# Patient Record
Sex: FEMALE | Race: WHITE
[De-identification: names, ages, dates, MRNs, and addresses within clinical notes are randomized per-mention and may not be internally consistent; named-entity substitution may affect disease eponyms.]

---

## 2017-03-03 ENCOUNTER — HOSPITAL ENCOUNTER (OUTPATIENT)
Dept: HOSPITAL 45 - C.LABBC | Age: 57
Discharge: HOME | End: 2017-03-03
Attending: INTERNAL MEDICINE
Payer: COMMERCIAL

## 2017-03-03 DIAGNOSIS — E55.9: Primary | ICD-10-CM

## 2017-03-03 DIAGNOSIS — I25.10: ICD-10-CM

## 2017-03-03 LAB
ALBUMIN/GLOB SERPL: 1.2 {RATIO} (ref 0.9–2)
ALP SERPL-CCNC: 86 U/L (ref 45–117)
ALT SERPL-CCNC: 37 U/L (ref 12–78)
ANION GAP SERPL CALC-SCNC: 9 MMOL/L (ref 3–11)
AST SERPL-CCNC: 19 U/L (ref 15–37)
BUN SERPL-MCNC: 9 MG/DL (ref 7–18)
BUN/CREAT SERPL: 12.2 (ref 10–20)
CALCIUM SERPL-MCNC: 9 MG/DL (ref 8.5–10.1)
CHLORIDE SERPL-SCNC: 106 MMOL/L (ref 98–107)
CHOLEST/HDLC SERPL: 4.7 {RATIO}
CO2 SERPL-SCNC: 26 MMOL/L (ref 21–32)
CREAT SERPL-MCNC: 0.71 MG/DL (ref 0.6–1.2)
GLOBULIN SER-MCNC: 3.5 GM/DL (ref 2.5–4)
GLUCOSE SERPL-MCNC: 91 MG/DL (ref 70–99)
GLUCOSE UR QL: 43 MG/DL
KETONES UR QL STRIP: 139 MG/DL
NITRITE UR QL STRIP: 90 MG/DL (ref 0–150)
PH UR: 200 MG/DL (ref 0–200)
POTASSIUM SERPL-SCNC: 4 MMOL/L (ref 3.5–5.1)
SODIUM SERPL-SCNC: 141 MMOL/L (ref 136–145)
VERY LOW DENSITY LIPOPROT CALC: 18 MG/DL

## 2017-03-04 LAB — EST. AVERAGE GLUCOSE BLD GHB EST-MCNC: 126 MG/DL

## 2018-02-28 ENCOUNTER — HOSPITAL ENCOUNTER (INPATIENT)
Dept: HOSPITAL 45 - C.EDC | Age: 58
LOS: 3 days | Discharge: HOME | DRG: 440 | End: 2018-03-03
Attending: HOSPITALIST | Admitting: INTERNAL MEDICINE
Payer: COMMERCIAL

## 2018-02-28 VITALS
HEIGHT: 62 IN | BODY MASS INDEX: 31.4 KG/M2 | BODY MASS INDEX: 31.4 KG/M2 | HEIGHT: 62 IN | WEIGHT: 170.64 LBS | WEIGHT: 170.64 LBS

## 2018-02-28 VITALS — HEART RATE: 82 BPM | DIASTOLIC BLOOD PRESSURE: 108 MMHG | TEMPERATURE: 99.14 F | SYSTOLIC BLOOD PRESSURE: 172 MMHG

## 2018-02-28 DIAGNOSIS — Z88.5: ICD-10-CM

## 2018-02-28 DIAGNOSIS — K85.90: Primary | ICD-10-CM

## 2018-02-28 DIAGNOSIS — Z82.3: ICD-10-CM

## 2018-02-28 DIAGNOSIS — Z79.82: ICD-10-CM

## 2018-02-28 DIAGNOSIS — Z88.1: ICD-10-CM

## 2018-02-28 LAB
ALBUMIN SERPL-MCNC: 3.4 GM/DL (ref 3.4–5)
ALP SERPL-CCNC: 77 U/L (ref 45–117)
ALT SERPL-CCNC: 33 U/L (ref 12–78)
AST SERPL-CCNC: 61 U/L (ref 15–37)
BASOPHILS # BLD: 0 K/UL (ref 0–0.2)
BASOPHILS NFR BLD: 0 %
BUN SERPL-MCNC: 14 MG/DL (ref 7–18)
CALCIUM SERPL-MCNC: 8.1 MG/DL (ref 8.5–10.1)
CO2 SERPL-SCNC: 27 MMOL/L (ref 21–32)
CREAT SERPL-MCNC: 0.77 MG/DL (ref 0.6–1.2)
EOS ABS #: 0 K/UL (ref 0–0.5)
EOSINOPHIL NFR BLD AUTO: 240 K/UL (ref 130–400)
GLUCOSE SERPL-MCNC: 96 MG/DL (ref 70–99)
HCT VFR BLD CALC: 41.4 % (ref 37–47)
HGB BLD-MCNC: 15.3 G/DL (ref 12–16)
IG#: 0.02 K/UL (ref 0–0.02)
IMM GRANULOCYTES NFR BLD AUTO: 5.4 %
INFLUENZA B ANTIGEN: (no result)
INR PPP: 1 (ref 0.9–1.1)
KETONES UR QL STRIP: 72 MG/DL
LIPASE: 2118 U/L (ref 73–393)
LYMPHOCYTES # BLD: 0.48 K/UL (ref 1.2–3.4)
MCH RBC QN AUTO: 30.5 PG (ref 25–34)
MCHC RBC AUTO-ENTMCNC: 37 G/DL (ref 32–36)
MCV RBC AUTO: 82.5 FL (ref 80–100)
MONO ABS #: 0.58 K/UL (ref 0.11–0.59)
MONOCYTES NFR BLD: 6.5 %
NEUT ABS #: 7.82 K/UL (ref 1.4–6.5)
NEUTROPHILS # BLD AUTO: 0 %
NEUTROPHILS NFR BLD AUTO: 87.9 %
PH UR: 123 MG/DL (ref 0–200)
PMV BLD AUTO: 9.8 FL (ref 7.4–10.4)
POTASSIUM SERPL-SCNC: 3.5 MMOL/L (ref 3.5–5.1)
PROT SERPL-MCNC: 6.8 GM/DL (ref 6.4–8.2)
RED CELL DISTRIBUTION WIDTH CV: 13 % (ref 11.5–14.5)
RED CELL DISTRIBUTION WIDTH SD: 38.6 FL (ref 36.4–46.3)
SODIUM SERPL-SCNC: 134 MMOL/L (ref 136–145)
WBC # BLD AUTO: 8.9 K/UL (ref 4.8–10.8)

## 2018-02-28 RX ADMIN — PANTOPRAZOLE SODIUM SCH MLS/MIN: 40 INJECTION, POWDER, FOR SOLUTION INTRAVENOUS at 22:16

## 2018-02-28 RX ADMIN — ENOXAPARIN SODIUM SCH MG: 40 INJECTION SUBCUTANEOUS at 22:00

## 2018-02-28 RX ADMIN — OSELTAMIVIR PHOSPHATE SCH MG: 75 CAPSULE ORAL at 21:05

## 2018-02-28 RX ADMIN — SODIUM CHLORIDE SCH MLS/HR: 900 INJECTION, SOLUTION INTRAVENOUS at 21:07

## 2018-02-28 RX ADMIN — ONDANSETRON PRN MG: 2 INJECTION INTRAMUSCULAR; INTRAVENOUS at 22:27

## 2018-02-28 NOTE — HISTORY AND PHYSICAL
History & Physical


Date & Time of Service:


Feb 28, 2018 at 18:22


Chief Complaint:


Nausea And Vomiting


Primary Care Physician:


Carlos Patino M.D.


History of Present Illness


Source:  patient


57-year-old female with history of hypertension, patient stopped taking 

medications for her blood pressure claiming that I stop taking the medicine 

because nothing is working, also patient reported high blood sugar she does not 

take any medications of that.  Patient only takes aspirin 81 mg once daily.  

Presented to the ED with 3 days history of epigastric pain nausea and vomiting.

  Denies any hematemesis denies any fever or chills.  Admits to some upper 

respiratory tract infection and shortness of breath, patient gets cough when 

she tries to take a deep breath.  In the ED patient was found to have a lipase 

of 2000, she also was found to have influenza positive, her blood pressure 

systolic was more than 200 but she was asymptomatic from that aspect.





Patient denies alcohol drinking, states she drinks socially.


I have a feeling that she is downplaying the amount she drinks because she said 

her  disagreed with her comments that she drinks only socially, she said 

sixpacks of beer last her 4 months and she only drinks hard liquor twice or 3 

times a year.





Denies any history of dyslipidemia.





Family History





FH: arthritis


FH: stroke





Social History


Smoking Status:  Never Smoker


Smokeless Tobacco Use:  No


Drug Use:  none


Marital Status:  





Allergies


Coded Allergies:  


     Hydroquinone (Verified  Allergy, Severe, BLISTER, 2/28/18)


 AFFECTS HER AT "20%"


     Hydrocortisone (Verified  Allergy, Unknown, BLISTERS, 2/28/18)


     Latex1 -Allergic Contact Dermititis (Verified  Allergy, Unknown, BLISTERS, 

2/28/18)


     Neomycin (Verified  Allergy, Unknown, BLISTERS, 2/28/18)


Uncoded Allergies:  


     AMMONIUM THIOGLYCOLATE (Allergy, Severe, BLISTERS, 8/12/16)


 COMPONENT OF LATEX





Home Medications


Scheduled


Aspirin (Aspirin 81), 81 MG PO DAILY





Review of Systems


Review of system


Constitutional: No fever / no chills / no sweats / no weakness / no fatigue


Eyes: no blurring of vision / no eye pain / no discharge / no redness 


ENT: no hearing loss / no epistaxis /no swallowing problems


Respiratory: Nonproductive cough, mild shortness of breath, upper respiratory 

tract symptoms/ no hemoptysis


Cardiovascular: no Chest pain / no lower extremity edema / no palpitation


Abdomen: As mentioned in HPI positive for epigastric tenderness, vomiting no 

diarrhea


Musculoskeletal: no joint pain / no muscle pain / no joint swelling


Genitourinary: no dysuria / no incontinence / no urinary retention


Neurologic: no focal weakness / no numbness/tingling / no ataxia


Psychiatric: no depression symptoms / no anxiety / no insomnia 


Endocrine: no excessive thirst / no excessive urination


Hematologic: no abnormal bleeding / no bruising  / no LN swelling


Skin: No rash / no pallor





Physical Exam


Vital Signs











  Date Time  Temp Pulse Resp B/P (MAP) Pulse Ox O2 Delivery O2 Flow Rate FiO2


 


2/28/18 16:30  73 18 193/84 97 Room Air  


 


2/28/18 14:25  80 18 195/111 95 Room Air  


 


2/28/18 12:45 37.1 82 18 215/97 95 Room Air  








Physical examination


General patient appears to be comfortable, not in acute distress


HEENT: Atraumatic , normocephalic /no jaundice /no pallor /anicteric /no dry 

mucous membrane /normal external ear inspection


Neck: Supple /no swelling /central trach


Heart: S1/S2 normal/regular rate and rhythm/no gallop /no rub /no murmur


Lungs: Scattered rhonchi bilaterally, decreased air entry bilaterally.


Abdomen: Abdomen is soft, no organomegaly, no pulsatile mass, does have 

epigastric tenderness and right upper quadrant tenderness no guarding or rebound


Musculoskeletal: No swelling/no edema/no tenderness/normal range of motion


Neuro exam: Awake alert oriented 3/cranial nerves II through XII appear to be 

intact/sensation intact/moves all extremities/no abnormal movements


Psychiatric evaluation: No depressed mood/normal affect


Skin: No rash on exposed skin area/no erythema


Extremity: Normal pulse/no pitting edema/no clubbing or cyanosis


Endocrine/lymphatic: No obvious lymphadenopathy /no lymphedema





Diagnostics


Laboratory Results





Results Past 24 Hours








Test


  2/28/18


12:45 2/28/18


14:21 2/28/18


14:39 2/28/18


15:54 Range/Units


 


 


White Blood Count 8.90    4.8-10.8  K/uL


 


Red Blood Count 5.02    4.2-5.4  M/uL


 


Hemoglobin 15.3    12.0-16.0  g/dL


 


Hematocrit 41.4    37-47  %


 


Mean Corpuscular Volume 82.5      fL


 


Mean Corpuscular Hemoglobin 30.5    25-34  pg


 


Mean Corpuscular Hemoglobin


Concent 37.0


  


  


  


  32-36  g/dl


 


 


Platelet Count 240    130-400  K/uL


 


Mean Platelet Volume 9.8    7.4-10.4  fL


 


Neutrophils (%) (Auto) 87.9     %


 


Lymphocytes (%) (Auto) 5.4     %


 


Monocytes (%) (Auto) 6.5     %


 


Eosinophils (%) (Auto) 0.0     %


 


Basophils (%) (Auto) 0.0     %


 


Neutrophils # (Auto) 7.82    1.4-6.5  K/uL


 


Lymphocytes # (Auto) 0.48    1.2-3.4  K/uL


 


Monocytes # (Auto) 0.58    0.11-0.59  K/uL


 


Eosinophils # (Auto) 0.00    0-0.5  K/uL


 


Basophils # (Auto) 0.00    0-0.2  K/uL


 


RDW Standard Deviation 38.6    36.4-46.3  fL


 


RDW Coefficient of Variation 13.0    11.5-14.5  %


 


Immature Granulocyte % (Auto) 0.2     %


 


Immature Granulocyte # (Auto) 0.02    0.00-0.02  K/uL


 


Bedside Troponin I  < 0.030   0-0.045  ng/ml


 


Influenza Type A Antigen   POS for Influ A  NEG  


 


Influenza Type B Antigen   Neg for Influ B  NEG  


 


Sodium Level    134 136-145  mmol/L


 


Potassium Level    3.5 3.5-5.1  mmol/L


 


Chloride Level    100   mmol/L


 


Carbon Dioxide Level    27 21-32  mmol/L


 


Anion Gap    7.0 3-11  mmol/L


 


Blood Urea Nitrogen    14 7-18  mg/dl


 


Creatinine


  


  


  


  0.77


  0.60-1.20


mg/dl


 


Est Creatinine Clear Calc


Drug Dose 


  


  


  80.0


   ml/min


 


 


Estimated GFR (


American) 


  


  


  98.0


   


 


 


Estimated GFR (Non-


American 


  


  


  84.5


   


 


 


BUN/Creatinine Ratio    18.1 10-20  


 


Random Glucose    96 70-99  mg/dl


 


Calcium Level    8.1 8.5-10.1  mg/dl


 


Total Bilirubin    0.3 0.2-1  mg/dl


 


Direct Bilirubin    0.1 0-0.2  mg/dl


 


Aspartate Amino Transf


(AST/SGOT) 


  


  


  61


  15-37  U/L


 


 


Alanine Aminotransferase


(ALT/SGPT) 


  


  


  33


  12-78  U/L


 


 


Alkaline Phosphatase    77   U/L


 


Total Protein    6.8 6.4-8.2  gm/dl


 


Albumin    3.4 3.4-5.0  gm/dl


 


Lipase    2118   U/L


 


Test


  2/28/18


18:02 


  


  


  Range/Units


 











Diagnostic Radiology


CT scan abdomen results


IMPRESSION:  


1. No evidence of bowel obstruction. No evidence of free air


2. No CT evidence of pancreatitis.


3. Mild hepatic steatosis.


4. 21 mm left adrenal gland nodule. Right adrenal gland thickening.


5. No evidence of acute diverticulitis.


6. No evidence of acute appendicitis.





Impression


Assessment and Plan


57-year-old female noncompliant with past medical history of hypertension, 

nontreated, obesity and elevated blood sugar presented to the emergency room 

with vomiting/abdominal pain/cough and shortness of breath.





Assessment


Influenza A infection


Acute pancreatitis


Hypertensive crisis


Obesity


Mildly elevated blood sugar


Incidental finding of 21 mm left adrenal gland nodule. Right adrenal gland 

thickening





Plan


Admit patient to telemetry


Patient is asymptomatic from her hypertension, reported having hypertension 

that is resistant to treatment in the past, will treat her blood pressure very 

slowly over a period of time, start today on metoprolol 25 mg p.o. twice daily, 

hydralazine only for systolic blood pressure more than 180, target pressure 

drop is less than 20% within the next 24 hours.  Avoid lisinopril as it can 

cause pancreatitis but self.


Start Tamiflu for influenza


Keep n.p.o. for bowel rest except medications and ice chips and sips of water


Introduce oral intake as early as possible


Generous IV fluid hydration


CT scan abdomen ruled out hemorrhagic pancreatitis /gallbladder stones , 

results attached


Hold off on antibiotics unless hemorrhagic pancreatitis is suspected


Lipid spaniel rule out hypertriglyceridemia


Hemoglobin A1c rule out diabetes


Pain management


Pepcid IV twice daily


Heparin for DVT prophylaxis





Resuscitation Status








VTE Prophylaxis


Will order VTE Prophylaxis:  Yes

## 2018-02-28 NOTE — DIAGNOSTIC IMAGING REPORT
CHEST ONE VIEW PORTABLE



CLINICAL HISTORY: Chest pain, nausea and vomiting.    



COMPARISON STUDY:  No previous studies for comparison.



FINDINGS: Lung volumes are normal. There is no pneumothorax or pleural effusion.

There is no consolidation to suggest pneumonia. Minimal bibasilar opacities

favor atelectasis. There is no evidence for pulmonary edema. Cardiomediastinal

silhouette is normal. 



IMPRESSION:  No acute cardiopulmonary findings. 









Electronically signed by:  Capo Palacios M.D.

2/28/2018 2:28 PM



Dictated Date/Time:  2/28/2018 2:27 PM

## 2018-02-28 NOTE — DIAGNOSTIC IMAGING REPORT
CT ABD/PELVIS IV CONTRAST ONLY



CLINICAL HISTORY: Epigastric pain. Possible pancreatitis.    



COMPARISON STUDY:  August 12, 2016



TECHNIQUE: Following the IV administration of     mL of Optiray-320, CT scan of

the abdomen and pelvis was performed from the lung bases to the proximal femurs.

Images are reviewed in the axial, sagittal, and coronal planes. IV contrast was

administered without complication.  A dose lowering technique was utilized

adhering to the principles of ALARA.





CT DOSE: 544.05 mGy.cm



FINDINGS:



Lower chest: There is respiratory motion artifact. The heart is normal in size.

There is minor basilar atelectasis.



Liver: There is mild hepatic steatosis. No focal masses are visualized. There is

no ductal dilatation.



Gallbladder: Unremarkable.



Spleen: Normal in size and attenuation.



Pancreas: Unremarkable.



Adrenal glands: There is a 21 mm left adrenal gland nodule. There is mild right

adrenal gland thickening.



Kidneys: There is symmetric renal cortical enhancement. The kidneys are normal

in size without hydronephrosis.



Bowel: There are no transition zones indicate bowel obstruction. There is no

acute diverticulitis. There is no evidence of acute appendicitis. Peritoneum:

There is no intraperitoneal free air or abdominal ascites.



Vasculature: The abdominal aorta is normal in course and caliber.



Adenopathy: None.



Pelvic viscera: The bladder, and pelvic viscera are unremarkable.



Skeletal structures: No destructive osseous lesions are seen.



IMPRESSION:  

1. No evidence of bowel obstruction. No evidence of free air

2. No CT evidence of pancreatitis.

3. Mild hepatic steatosis.

4. 21 mm left adrenal gland nodule. Right adrenal gland thickening.

5. No evidence of acute diverticulitis.

6. No evidence of acute appendicitis.







Electronically signed by:  Keny Waggoner M.D.

2/28/2018 5:59 PM



Dictated Date/Time:  2/28/2018 5:55 PM

## 2018-02-28 NOTE — EMERGENCY ROOM VISIT NOTE
History


Report prepared by Debi:  Ivan Torres


Under the Supervision of:  Dr. Harsha Rojas M.D.


First contact with patient:  14:00


Chief Complaint:  NAUSEA


Stated Complaint:  NAUSEA AND VOMITING


Nursing Triage Summary:  


Patient arrived via ALS from home. Patient c/o intermittent N/V/d x3 days and a 


burning sensation in her stomach. Patient has hx of hypertension and is 


non-compliant with medications. Patient states, "I do not take the medications 


for my blood pressure because they don't work." Patient denies CP and SOB.





History of Present Illness


The patient is a 57 year old female who presents to the Emergency Room with 

complaints of worsening nausea that began three days ago. She has a past 

medical history of a bilateral oophorectomy and hypertension, but is non-

compliant with her blood pressure medications. The patient's  notes that 

he and his coworkers at work, recently had similar symptoms and believes he may 

have gotten her sick. The patient's symptoms began with chills and generalized 

weakness. She then progressed to experiencing nausea with episodes of vomiting 

intermittently. She is unable to eat solids but has been able to keep liquids 

down. She is also experiencing a headache with some left sided abdominal pain. 

She denies any fevers, chest pain, shortness of breath, diarrhea, or numbness.





   Source of History:  patient


   Onset:  three days ago


   Position:  other (GI)


   Symptom Intensity:  moderate


   Quality:  other (Nausea)


   Timing:  constant


   Associated Symptoms:  + chills, + headache, + vomiting, + abdominal pain, + 

weakness (generalized), No fevers, No chest pain, No SOB, No diarrhea





Review of Systems


See HPI for pertinent positives & negatives. A total of 10 systems reviewed and 

were otherwise negative.





Past Medical & Surgical


Medical Problems:


(1) Acute pancreatitis


(2) Gastroenteritis


(3) Hypertension





Old medical records were reviewed. Nurse's notes were reviewed and I agree with.





Family History





FH: arthritis


FH: stroke





Social History


Smoking Status:  Never Smoker


Smokeless Tobacco Use:  No


Drug Use:  none


Marital Status:  


Housing Status:  lives with significant other





Current/Historical Medications


Scheduled


Aspirin (Aspirin 81), 81 MG PO DAILY





Allergies


Coded Allergies:  


     Hydroquinone (Verified  Allergy, Severe, BLISTER, 2/28/18)


 AFFECTS HER AT "20%"


     Hydrocortisone (Verified  Allergy, Unknown, BLISTERS, 2/28/18)


     Latex1 -Allergic Contact Dermititis (Verified  Allergy, Unknown, BLISTERS, 

2/28/18)


     Neomycin (Verified  Allergy, Unknown, BLISTERS, 2/28/18)


Uncoded Allergies:  


     AMMONIUM THIOGLYCOLATE (Allergy, Severe, BLISTERS, 8/12/16)


 COMPONENT OF LATEX





Physical Exam


Vital Signs











  Date Time  Temp Pulse Resp B/P (MAP) Pulse Ox O2 Delivery O2 Flow Rate FiO2


 


2/28/18 18:30  83 18 189/100 95 Room Air  


 


2/28/18 16:30  73 18 193/84 97 Room Air  


 


2/28/18 14:25  80 18 195/111 95 Room Air  


 


2/28/18 12:45 37.1 82 18 215/97 95 Room Air  











Physical Exam


General: Mildly-ill appearing middle-aged female in no acute distress. 


HEENT: Normal cephalic atraumatic.  Pupils are equal round and reactive to 

light.  Extraocular movements are intact.  Oropharynx is pink with moist mucous 

membranes.  No swelling of the mouth lips or tongue.


Neck: Supple with a midline trachea.  No meningeal signs or stiffness, no JVD 

or bruits. No Stridor.


Chest: Clear to auscultation bilaterally.  No wheezes or rhonchi.  No increased 

work of breathing.


Heart: regular rate and rhythm. 


Abdomen: Soft, mildly diffusely tender, nondistended without rebound guarding 

or rigidity.  


Extremities: No cyanosis clubbing or edema. No calf tenderness or assymetry


Spine/Back. Non tender to palpation. No CVA tenderness


Skin: Good turgor without rashes.


Neurologic exam: Cranial nerves two through 12 are intact.  Motor and sensation 

are intact and symmetrical throughout.





Medical Decision & Procedures


ER Provider


Diagnostic Interpretation:


Radiology results as stated below per my review and radiologist interpretation:





CHEST ONE VIEW PORTABLE





CLINICAL HISTORY: Chest pain, nausea and vomiting.    





COMPARISON STUDY:  No previous studies for comparison.





FINDINGS: Lung volumes are normal. There is no pneumothorax or pleural effusion.


There is no consolidation to suggest pneumonia. Minimal bibasilar opacities


favor atelectasis. There is no evidence for pulmonary edema. Cardiomediastinal


silhouette is normal. 





IMPRESSION:  No acute cardiopulmonary findings. 





Electronically signed by:  Capo Palacios M.D.


2/28/2018 2:28 PM





Dictated Date/Time:  2/28/2018 2:27 PM





Laboratory Results


2/28/18 12:45








Red Blood Count 5.02, Mean Corpuscular Volume 82.5, Mean Corpuscular Hemoglobin 

30.5, Mean Corpuscular Hemoglobin Concent 37.0, Mean Platelet Volume 9.8, 

Neutrophils (%) (Auto) 87.9, Lymphocytes (%) (Auto) 5.4, Monocytes (%) (Auto) 

6.5, Eosinophils (%) (Auto) 0.0, Basophils (%) (Auto) 0.0, Neutrophils # (Auto) 

7.82, Lymphocytes # (Auto) 0.48, Monocytes # (Auto) 0.58, Eosinophils # (Auto) 

0.00, Basophils # (Auto) 0.00





2/28/18 15:54

















Test


  2/28/18


12:45 2/28/18


14:21 2/28/18


14:39 2/28/18


15:54


 


White Blood Count


  8.90 K/uL


(4.8-10.8) 


  


  


 


 


Red Blood Count


  5.02 M/uL


(4.2-5.4) 


  


  


 


 


Hemoglobin


  15.3 g/dL


(12.0-16.0) 


  


  


 


 


Hematocrit 41.4 % (37-47)    


 


Mean Corpuscular Volume


  82.5 fL


() 


  


  


 


 


Mean Corpuscular Hemoglobin


  30.5 pg


(25-34) 


  


  


 


 


Mean Corpuscular Hemoglobin


Concent 37.0 g/dl


(32-36) 


  


  


 


 


Platelet Count


  240 K/uL


(130-400) 


  


  


 


 


Mean Platelet Volume


  9.8 fL


(7.4-10.4) 


  


  


 


 


Neutrophils (%) (Auto) 87.9 %    


 


Lymphocytes (%) (Auto) 5.4 %    


 


Monocytes (%) (Auto) 6.5 %    


 


Eosinophils (%) (Auto) 0.0 %    


 


Basophils (%) (Auto) 0.0 %    


 


Neutrophils # (Auto)


  7.82 K/uL


(1.4-6.5) 


  


  


 


 


Lymphocytes # (Auto)


  0.48 K/uL


(1.2-3.4) 


  


  


 


 


Monocytes # (Auto)


  0.58 K/uL


(0.11-0.59) 


  


  


 


 


Eosinophils # (Auto)


  0.00 K/uL


(0-0.5) 


  


  


 


 


Basophils # (Auto)


  0.00 K/uL


(0-0.2) 


  


  


 


 


RDW Standard Deviation


  38.6 fL


(36.4-46.3) 


  


  


 


 


RDW Coefficient of Variation


  13.0 %


(11.5-14.5) 


  


  


 


 


Immature Granulocyte % (Auto) 0.2 %    


 


Immature Granulocyte # (Auto)


  0.02 K/uL


(0.00-0.02) 


  


  


 


 


Bedside Troponin I


  


  < 0.030 ng/ml


(0-0.045) 


  


 


 


Influenza Type A Antigen


  


  


  POS for Influ


A (NEG) 


 


 


Influenza Type B Antigen


  


  


  Neg for Influ


B (NEG) 


 


 


Anion Gap


  


  


  


  7.0 mmol/L


(3-11)


 


Est Creatinine Clear Calc


Drug Dose 


  


  


  80.0 ml/min 


 


 


Estimated GFR (


American) 


  


  


  98.0 


 


 


Estimated GFR (Non-


American 


  


  


  84.5 


 


 


BUN/Creatinine Ratio    18.1 (10-20) 


 


Calcium Level


  


  


  


  8.1 mg/dl


(8.5-10.1)


 


Total Bilirubin


  


  


  


  0.3 mg/dl


(0.2-1)


 


Direct Bilirubin


  


  


  


  0.1 mg/dl


(0-0.2)


 


Aspartate Amino Transf


(AST/SGOT) 


  


  


  61 U/L (15-37) 


 


 


Alanine Aminotransferase


(ALT/SGPT) 


  


  


  33 U/L (12-78) 


 


 


Alkaline Phosphatase


  


  


  


  77 U/L


()


 


Total Protein


  


  


  


  6.8 gm/dl


(6.4-8.2)


 


Albumin


  


  


  


  3.4 gm/dl


(3.4-5.0)


 


Lipase


  


  


  


  2118 U/L


()





Laboratory studies as stated above per my review.





Medications Administered











 Medications


  (Trade)  Dose


 Ordered  Sig/Maryam


 Route  Start Time


 Stop Time Status Last Admin


Dose Admin


 


 Sodium Chloride  1,000 ml @ 


 999 mls/hr  Q1H1M STAT


 IV  2/28/18 14:10


 2/28/18 15:10 DC 2/28/18 14:27


999 MLS/HR


 


 Ondansetron HCl


  (Zofran Inj)  4 mg  NOW  STAT


 IV  2/28/18 14:10


 2/28/18 14:12 DC 2/28/18 14:27


4 MG


 


 Morphine Sulfate


  (MoRPHine


 SULFATE INJ)  2 mg  NOW  STAT


 IV  2/28/18 14:10


 2/28/18 14:12 DC 2/28/18 14:27


2 MG


 


 Oseltamivir


 Phosphate


  (Tamiflu Cap)  75 mg  NOW  STAT


 PO  2/28/18 17:01


 2/28/18 17:02 DC 2/28/18 18:34


75 MG











ECG Per My Interpretation


Indication:  nausea


Rate (beats per minute):  75


Rhythm:  normal sinus


Findings:  no acute ischemic change, other (No PVC)


Comparison ECG Date:  no prior available





ED Course


1400: Past medical records reviewed. The patient was evaluated in room C5, and 

a complete history and physical examination were performed.





1410: Ordered Morphine Sulfate 2 mg IV, Zofran Inj 4 mg IV, Sodium Chloride 

1000 ml @ 999 mls/hr IV





1548: The patient is beginning to feel better. She is positive for the flu. 





1701: Ordered Tamiflu Cap 75 mg PO





20671: Upon reevaluation, the patient is resting. I discussed the results and 

treatment plan with the patient. She verbalized agreement of the treatment 

plan. The patient will be evaluated by Dr. Whitney Cisse of [facility] for 

further management.





Medical Decision


Differentials include, but are not limited to; gastroenteritis, infection, 

cardiac disease, and electrolyte or metabolic abnormality.





This patient comes in as described above.  She has had nausea, vomiting ,

diarrhea, and flulike symptoms.  Her  is also been sick.  She has some 

mild abdominal pain.  She has no history of pancreatitis or drinking.  IV 

access established and she was hydrated with IV normal saline. she given IV 

Zofran is feeling quite a bit better.  EKG does not suggest acute coronary 

syndrome or arrhythmia influenza swab was positive.  She was placed on droplet 

isolation.  She was given Tamiflu.  Also her lipase came back elevated over 

2000.  She appears to have pancreatitis as well.  I do think she needs to be 

admitted for further treatment and evaluation of pancreatitis have ordered a 

CAT scan as well as consulted the [facility] hospitalist to 

see her in the ER.  She was happy with the plan and will be admitted





Medication Reconcilliation


Current Medication List:  was personally reviewed by me





Blood Pressure Screening


Patient's blood pressure:  Elevated blood pressure


Referred to the hospitalist





Consults


Time Called:  1709


Consulting Physician:  Dr. Whitney Cisse - [facility]


Returned Call:  [phone]


Discussed the patient's case. The patient will be evaluated for further 

management.





Impression





 Primary Impression:  


 Pancreatitis


 Additional Impression:  


 Influenza





Scribe Attestation


The scribe's documentation has been prepared under my direction and personally 

reviewed by me in its entirety. I confirm that the note above accurately 

reflects all work, treatment, procedures, and medical decision making performed 

by me.





Departure Information


Dispostion


Being Evaluated By Hospitalist





Referrals


Carlos Patino M.D. (PCP)





Patient Instructions


My [facility]





Problem Qualifiers

## 2018-03-01 VITALS
TEMPERATURE: 100.58 F | DIASTOLIC BLOOD PRESSURE: 84 MMHG | HEART RATE: 78 BPM | SYSTOLIC BLOOD PRESSURE: 127 MMHG | OXYGEN SATURATION: 92 %

## 2018-03-01 VITALS
DIASTOLIC BLOOD PRESSURE: 76 MMHG | SYSTOLIC BLOOD PRESSURE: 126 MMHG | OXYGEN SATURATION: 93 % | TEMPERATURE: 99.86 F | HEART RATE: 72 BPM

## 2018-03-01 VITALS
OXYGEN SATURATION: 93 % | HEART RATE: 72 BPM | TEMPERATURE: 98.78 F | SYSTOLIC BLOOD PRESSURE: 126 MMHG | DIASTOLIC BLOOD PRESSURE: 76 MMHG

## 2018-03-01 VITALS
DIASTOLIC BLOOD PRESSURE: 89 MMHG | SYSTOLIC BLOOD PRESSURE: 155 MMHG | OXYGEN SATURATION: 92 % | TEMPERATURE: 100.04 F | HEART RATE: 72 BPM

## 2018-03-01 VITALS
SYSTOLIC BLOOD PRESSURE: 134 MMHG | TEMPERATURE: 99.68 F | OXYGEN SATURATION: 93 % | DIASTOLIC BLOOD PRESSURE: 80 MMHG | HEART RATE: 79 BPM

## 2018-03-01 VITALS — TEMPERATURE: 98.78 F

## 2018-03-01 VITALS — OXYGEN SATURATION: 93 %

## 2018-03-01 VITALS — OXYGEN SATURATION: 92 %

## 2018-03-01 LAB
ALBUMIN SERPL-MCNC: 3.1 GM/DL (ref 3.4–5)
ALP SERPL-CCNC: 70 U/L (ref 45–117)
ALT SERPL-CCNC: 33 U/L (ref 12–78)
AST SERPL-CCNC: 57 U/L (ref 15–37)
BASOPHILS # BLD: 0.01 K/UL (ref 0–0.2)
BASOPHILS NFR BLD: 0.1 %
BUN SERPL-MCNC: 13 MG/DL (ref 7–18)
CALCIUM SERPL-MCNC: 8.1 MG/DL (ref 8.5–10.1)
CO2 SERPL-SCNC: 24 MMOL/L (ref 21–32)
CREAT SERPL-MCNC: 0.78 MG/DL (ref 0.6–1.2)
EOS ABS #: 0 K/UL (ref 0–0.5)
EOSINOPHIL NFR BLD AUTO: 220 K/UL (ref 130–400)
GLUCOSE SERPL-MCNC: 86 MG/DL (ref 70–99)
HBA1C MFR BLD: 6 % (ref 4.5–5.6)
HCT VFR BLD CALC: 37.4 % (ref 37–47)
HGB BLD-MCNC: 13.2 G/DL (ref 12–16)
IG#: 0.02 K/UL (ref 0–0.02)
IMM GRANULOCYTES NFR BLD AUTO: 11.3 %
LIPASE: 808 U/L (ref 73–393)
LYMPHOCYTES # BLD: 0.77 K/UL (ref 1.2–3.4)
MCH RBC QN AUTO: 29.7 PG (ref 25–34)
MCHC RBC AUTO-ENTMCNC: 35.3 G/DL (ref 32–36)
MCV RBC AUTO: 84 FL (ref 80–100)
MONO ABS #: 1.09 K/UL (ref 0.11–0.59)
MONOCYTES NFR BLD: 16 %
NEUT ABS #: 4.94 K/UL (ref 1.4–6.5)
NEUTROPHILS # BLD AUTO: 0 %
NEUTROPHILS NFR BLD AUTO: 72.3 %
PMV BLD AUTO: 9.8 FL (ref 7.4–10.4)
POTASSIUM SERPL-SCNC: 3 MMOL/L (ref 3.5–5.1)
PROT SERPL-MCNC: 6.4 GM/DL (ref 6.4–8.2)
RED CELL DISTRIBUTION WIDTH CV: 13.1 % (ref 11.5–14.5)
RED CELL DISTRIBUTION WIDTH SD: 39.6 FL (ref 36.4–46.3)
SODIUM SERPL-SCNC: 136 MMOL/L (ref 136–145)
WBC # BLD AUTO: 6.83 K/UL (ref 4.8–10.8)

## 2018-03-01 RX ADMIN — OSELTAMIVIR PHOSPHATE SCH MG: 75 CAPSULE ORAL at 21:21

## 2018-03-01 RX ADMIN — Medication SCH MG: at 09:15

## 2018-03-01 RX ADMIN — OSELTAMIVIR PHOSPHATE SCH MG: 75 CAPSULE ORAL at 09:15

## 2018-03-01 RX ADMIN — METOPROLOL TARTRATE SCH MG: 25 TABLET, FILM COATED ORAL at 21:21

## 2018-03-01 RX ADMIN — METOPROLOL TARTRATE SCH MG: 25 TABLET, FILM COATED ORAL at 09:15

## 2018-03-01 RX ADMIN — SODIUM CHLORIDE SCH MLS/HR: 900 INJECTION, SOLUTION INTRAVENOUS at 16:01

## 2018-03-01 RX ADMIN — ENOXAPARIN SODIUM SCH MG: 40 INJECTION SUBCUTANEOUS at 21:22

## 2018-03-01 RX ADMIN — MORPHINE SULFATE PRN MG: 2 INJECTION, SOLUTION INTRAMUSCULAR; INTRAVENOUS at 19:25

## 2018-03-01 RX ADMIN — MORPHINE SULFATE PRN MG: 2 INJECTION, SOLUTION INTRAMUSCULAR; INTRAVENOUS at 00:15

## 2018-03-01 RX ADMIN — PANTOPRAZOLE SODIUM SCH MLS/MIN: 40 INJECTION, POWDER, FOR SOLUTION INTRAVENOUS at 11:14

## 2018-03-01 RX ADMIN — SODIUM CHLORIDE SCH MLS/HR: 900 INJECTION, SOLUTION INTRAVENOUS at 06:28

## 2018-03-01 RX ADMIN — ONDANSETRON PRN MG: 2 INJECTION INTRAMUSCULAR; INTRAVENOUS at 19:25

## 2018-03-01 NOTE — DIAGNOSTIC IMAGING REPORT
ABDOMEN LIMITED (US)



CLINICAL HISTORY: 57 years-old Female presenting with pancreatitis, need GB,

liver, pancrease, the biliary system. 



TECHNIQUE: Real-time grayscale and limited color Doppler ultrasound imaging of

the abdomen limited to the right upper quadrant was performed. 



COMPARISON: CT from 2/28/2018.



FINDINGS:



Pancreas: Visualized portions of the pancreatic head and body normal. Mild

prominence of the pancreatic duct, which is within the range of normal.



Liver: Mildly hyperechogenic parenchyma, although the right hemidiaphragm

remains visible, likely indicating mild steatosis. The liver measures 14.6 cm in

maximal sagittal dimension. No sonographic evidence of hepatic mass. Main portal

vein patent with normal directional flow.



Biliary: No intrahepatic biliary ductal dilatation. Common bile duct measures up

to 4 mm in diameter.



Gallbladder: No evidence of gallstones, gallbladder wall thickening, gallbladder

distention, or pericholecystic fluid or inflammatory change.    



Right kidney: Normal in appearance. No hydronephrosis.



Ascites: None.



IMPRESSION: 

1.  Hepatic steatosis. Correlate with liver function tests to exclude

steatohepatitis as a cause for abdominal pain.



2.  No cholelithiasis or biliary ductal dilatation.







Electronically signed by:  Carlos Reid M.D.

3/1/2018 7:11 PM



Dictated Date/Time:  3/1/2018 7:09 PM

## 2018-03-01 NOTE — HOSPITALIST PROGRESS NOTE
Hospitalist Progress Note


Date of Service


Mar 1, 2018.





Subjective


Pt evaluation today including:  conversation w/ patient, physical exam, lab 

review, review of studies, review of inpatient medication list


Voiding:  no voiding problems


Patient resting in bed. Feels improved since admission. 


No appetite- will start clears and advance as tolerated slowly. 


Epigastric abdominal pain has improved. Currently mild and well controlled. 


Overall body aches and weakness- RESOLVED. 





Patient denies any fever, chills, sweats, lightheadedness, dizziness, vision 

changes, CP, palpitations, edema, SOB, wheezing, cough, nausea, vomiting, 

diarrhea, urinary symptoms, melena, numbness/tingling, weakness, muscle/joint 

pain, anxiety/depression, active bleeding, or new skin discoloration/changes.





Medications





Current Inpatient Medications








 Medications


  (Trade)  Dose


 Ordered  Sig/Maryam


 Route  Start Time


 Stop Time Status Last Admin


Dose Admin


 


 Ioversol


  (Optiray 320)  111 ml  UD  PRN


 IV  2/28/18 17:15


 3/4/18 17:14   


 


 


 Aspirin


  (Ecotrin Tab)  81 mg  DAILY


 PO  3/1/18 09:00


 3/31/18 08:59  3/1/18 09:15


81 MG


 


 Enoxaparin Sodium


  (Lovenox Inj)  40 mg  Q24H


 SC  2/28/18 22:00


 3/30/18 21:59   


 


 


 Sodium Chloride  1,000 ml @ 


 100 mls/hr  Q10H


 IV  2/28/18 20:00


 3/30/18 18:01  3/1/18 06:28


100 MLS/HR


 


 Acetaminophen


  (Tylenol Tab)  650 mg  Q4H  PRN


 PO  2/28/18 18:15


 3/30/18 18:14   


 


 


 Al Hydrox/Mg


 Hydrox/Simethicone


  (Maalox Max Susp)  15 ml  Q4H  PRN


 PO  2/28/18 18:15


 3/30/18 18:14   


 


 


 Magnesium


 Hydroxide


  (Milk Of


 Magnesia Susp)  30 ml  Q12H  PRN


 PO  2/28/18 18:15


 3/30/18 18:14   


 


 


 Zolpidem Tartrate


  (Ambien Tab)  5 mg  HSZ  PRN


 PO  2/28/18 18:15


 3/30/18 18:14   


 


 


 Ondansetron HCl


  (Zofran Inj)  4 mg  Q6H  PRN


 IV  2/28/18 18:15


 3/30/18 18:14  2/28/18 22:27


4 MG


 


 Nitroglycerin


  (Nitrostat Tab)  0.4 mg  UD  PRN


 SL  2/28/18 18:15


 3/30/18 18:14   


 


 


 Morphine Sulfate


  (MoRPHine


 SULFATE INJ)  2 mg  Q4H  PRN


 IV  2/28/18 18:15


 3/14/18 18:14  3/1/18 00:15


2 MG


 


 Polyethylene


  (Miralax Powder


 Packet)  17 gm  DAILY  PRN


 PO  2/28/18 18:15


 3/30/18 18:14   


 


 


 Metoprolol


 Tartrate


  (Lopressor Tab)  25 mg  BID


 PO  3/1/18 09:00


 3/31/18 08:59  3/1/18 09:15


25 MG


 


 Hydralazine HCl


  (HydrALAZINE INJ)  10 mg  Q6H  PRN


 IV.  2/28/18 18:30


 3/30/18 18:29   


 


 


 Oseltamivir


 Phosphate


  (Tamiflu Cap)  75 mg  BID


 PO  2/28/18 21:00


 3/5/18 20:59  3/1/18 09:15


75 MG


 


 Pantoprazole


 Sodium 40 mg/


 Syringe  10 ml @ 5


 mls/min  DAILY@11


 IV  2/28/18 22:00


 3/30/18 21:59  3/1/18 11:14


5 MLS/MIN











Objective


Vital Signs











  Date Time  Temp Pulse Resp B/P (MAP) Pulse Ox O2 Delivery O2 Flow Rate FiO2


 


3/1/18 08:57 37.1 72 16 126/76 (93) 93 Room Air  


 


3/1/18 08:00      Room Air  


 


3/1/18 08:00     92 Room Air  


 


3/1/18 07:03 37.1       


 


3/1/18 04:39 38.1 78 20 127/84 (98) 92 Room Air  


 


3/1/18 04:00      Room Air  


 


3/1/18 00:19 37.6 79 16 134/80 (98) 93 Room Air  


 


3/1/18 00:00      Room Air  


 


2/28/18 20:29 37.3 82 18 172/108  Room Air  


 


2/28/18 19:39  82 18 175/100 93   


 


2/28/18 19:35  82 18 175/100 93 Room Air  


 


2/28/18 18:30  83 18 189/100 95 Room Air  


 


2/28/18 16:30  73 18 193/84 97 Room Air  


 


2/28/18 14:25  80 18 195/111 95 Room Air  


 


2/28/18 12:45 37.1 82 18 215/97 95 Room Air  











Physical Exam


General Appearance:  no apparent distress, + obese


Eyes:  normal inspection, PERRL


ENT:  hearing grossly normal


Neck:  supple


Respiratory/Chest:  lungs clear, no respiratory distress, no accessory muscle 

use


Cardiovascular:  regular rate, rhythm


Abdomen:  normal bowel sounds, soft, + tenderness (mild ttp of epigastric 

region )


Extremities:  no pedal edema, no calf tenderness


Neurologic/Psychiatric:  alert, normal mood/affect, oriented x 3


Skin:  normal color, warm/dry, no rash





Laboratory Results





Last 24 Hours








Test


  2/28/18


12:45 2/28/18


14:21 2/28/18


14:39 2/28/18


15:54


 


White Blood Count 8.90 K/uL    


 


Red Blood Count 5.02 M/uL    


 


Hemoglobin 15.3 g/dL    


 


Hematocrit 41.4 %    


 


Mean Corpuscular Volume 82.5 fL    


 


Mean Corpuscular Hemoglobin 30.5 pg    


 


Mean Corpuscular Hemoglobin


Concent 37.0 g/dl 


  


  


  


 


 


Platelet Count 240 K/uL    


 


Mean Platelet Volume 9.8 fL    


 


Neutrophils (%) (Auto) 87.9 %    


 


Lymphocytes (%) (Auto) 5.4 %    


 


Monocytes (%) (Auto) 6.5 %    


 


Eosinophils (%) (Auto) 0.0 %    


 


Basophils (%) (Auto) 0.0 %    


 


Neutrophils # (Auto) 7.82 K/uL    


 


Lymphocytes # (Auto) 0.48 K/uL    


 


Monocytes # (Auto) 0.58 K/uL    


 


Eosinophils # (Auto) 0.00 K/uL    


 


Basophils # (Auto) 0.00 K/uL    


 


RDW Standard Deviation 38.6 fL    


 


RDW Coefficient of Variation 13.0 %    


 


Immature Granulocyte % (Auto) 0.2 %    


 


Immature Granulocyte # (Auto) 0.02 K/uL    


 


Prothrombin Time 10.4 SECONDS    


 


Prothromb Time International


Ratio 1.0 


  


  


  


 


 


Bedside Troponin I  < 0.030 ng/ml   


 


Influenza Type A Antigen


  


  


  POS for Influ


A 


 


 


Influenza Type B Antigen


  


  


  Neg for Influ


B 


 


 


Sodium Level    134 mmol/L 


 


Potassium Level    3.5 mmol/L 


 


Chloride Level    100 mmol/L 


 


Carbon Dioxide Level    27 mmol/L 


 


Anion Gap    7.0 mmol/L 


 


Blood Urea Nitrogen    14 mg/dl 


 


Creatinine    0.77 mg/dl 


 


Est Creatinine Clear Calc


Drug Dose 


  


  


  80.0 ml/min 


 


 


Estimated GFR (


American) 


  


  


  98.0 


 


 


Estimated GFR (Non-


American 


  


  


  84.5 


 


 


BUN/Creatinine Ratio    18.1 


 


Random Glucose    96 mg/dl 


 


Calcium Level    8.1 mg/dl 


 


Total Bilirubin    0.3 mg/dl 


 


Direct Bilirubin    0.1 mg/dl 


 


Aspartate Amino Transf


(AST/SGOT) 


  


  


  61 U/L 


 


 


Alanine Aminotransferase


(ALT/SGPT) 


  


  


  33 U/L 


 


 


Alkaline Phosphatase    77 U/L 


 


Total Protein    6.8 gm/dl 


 


Albumin    3.4 gm/dl 


 


Triglycerides Level    100 mg/dl 


 


Cholesterol Level    123 mg/dl 


 


HDL Cholesterol    31 mg/dl 


 


LDL Cholesterol, Calculated    72 mg/dl 


 


VLDL Cholesterol, Calculated    20 mg/dl 


 


Cholesterol/HDL Ratio    4.0 


 


Lipase    2118 U/L 


 


Test


  2/28/18


21:00 3/1/18


06:36 3/1/18


07:31 


 


 


Urine Color YELLOW    


 


Urine Appearance CLEAR    


 


Urine pH 7.0    


 


Urine Specific Gravity 1.035    


 


Urine Protein 1+    


 


Urine Glucose (UA) NEG    


 


Urine Ketones NEG    


 


Urine Occult Blood 1+    


 


Urine Nitrite NEG    


 


Urine Bilirubin NEG    


 


Urine Urobilinogen NEG    


 


Urine Leukocyte Esterase NEG    


 


Urine WBC (Auto) 1-5 /hpf    


 


Urine RBC (Auto) 0-4 /hpf    


 


Urine Hyaline Casts (Auto) 0 /lpf    


 


Urine Epithelial Cells (Auto) 10-20 /lpf    


 


Urine Bacteria (Auto) NEG    


 


White Blood Count  6.83 K/uL   


 


Red Blood Count  4.45 M/uL   


 


Hemoglobin  13.2 g/dL   


 


Hematocrit  37.4 %   


 


Mean Corpuscular Volume  84.0 fL   


 


Mean Corpuscular Hemoglobin  29.7 pg   


 


Mean Corpuscular Hemoglobin


Concent 


  35.3 g/dl 


  


  


 


 


Platelet Count  220 K/uL   


 


Mean Platelet Volume  9.8 fL   


 


Neutrophils (%) (Auto)  72.3 %   


 


Lymphocytes (%) (Auto)  11.3 %   


 


Monocytes (%) (Auto)  16.0 %   


 


Eosinophils (%) (Auto)  0.0 %   


 


Basophils (%) (Auto)  0.1 %   


 


Neutrophils # (Auto)  4.94 K/uL   


 


Lymphocytes # (Auto)  0.77 K/uL   


 


Monocytes # (Auto)  1.09 K/uL   


 


Eosinophils # (Auto)  0.00 K/uL   


 


Basophils # (Auto)  0.01 K/uL   


 


RDW Standard Deviation  39.6 fL   


 


RDW Coefficient of Variation  13.1 %   


 


Immature Granulocyte % (Auto)  0.3 %   


 


Immature Granulocyte # (Auto)  0.02 K/uL   


 


Sodium Level  136 mmol/L   


 


Potassium Level  3.0 mmol/L   


 


Chloride Level  103 mmol/L   


 


Carbon Dioxide Level  24 mmol/L   


 


Anion Gap  9.0 mmol/L   


 


Blood Urea Nitrogen  13 mg/dl   


 


Creatinine  0.78 mg/dl   


 


Est Creatinine Clear Calc


Drug Dose 


  73.6 ml/min 


  


  


 


 


Estimated GFR (


American) 


  96.4 


  


  


 


 


Estimated GFR (Non-


American 


  83.2 


  


  


 


 


BUN/Creatinine Ratio  16.6   


 


Random Glucose  86 mg/dl   


 


Estimated Average Glucose  126 mg/dl   


 


Hemoglobin A1c  6.0 %   


 


Calcium Level  8.1 mg/dl   


 


Magnesium Level  2.1 mg/dl   


 


Total Bilirubin  0.3 mg/dl   


 


Direct Bilirubin  < 0.1 mg/dl   


 


Aspartate Amino Transf


(AST/SGOT) 


  57 U/L 


  


  


 


 


Alanine Aminotransferase


(ALT/SGPT) 


  33 U/L 


  


  


 


 


Alkaline Phosphatase  70 U/L   


 


Total Protein  6.4 gm/dl   


 


Albumin  3.1 gm/dl   


 


Globulin  3.3 gm/dl   


 


Albumin/Globulin Ratio  0.9   


 


Lipase  808 U/L   


 


Bedside Glucose   99 mg/dl  











Assessment and Plan


57-year-old female noncompliant with past medical history of hypertension, 

nontreated, obesity and elevated blood sugar presented to the emergency room 

with vomiting/abdominal pain/cough and shortness of breath.





Acute pancreatitis, likely secondary to Influenza:


- Admitted to med/surg


- IV NSS @ 100 ml/hr 


- Lipase 2100 --> 800 


- No gallstones on CT; no excessive ETOH use; triglycerides WNL 


- Slowly advance diet as tolerated- starting w/ clears 


- IV Morphine PRN for pain management 


- IV Zofran PRN for nausea 


- Discussed w/ GI, Madison Powell- does not need to see patient and feels most 

likely from viral source 





Influenza A:


- Droplet precautions


- Tamiflu 75 mg BID x5 days- last day of treatment on 3/5 





Hypokalemia: Replace w/ PO KCL supplement- follow PRP and replace PRN 





HTN- CONTROLLED: 


- Started on Lopressor 25 mg BID 


- Continue ASA daily 


- IV Hydralazine PRN 





Obesity:


- Lipid panel reviewed; hgbA1c 6.0%


- Encourage healthy diet and exercise 





GI prophylaxis: Protonix IV daily 





DVT prophylaxis: Lovenox SQ daily 





Code status: LEVEL I, FULL 





Dispo: Discharge to home once medically stable- hopefully in the next 1-2 days

## 2018-03-02 VITALS
DIASTOLIC BLOOD PRESSURE: 94 MMHG | HEART RATE: 70 BPM | TEMPERATURE: 99.32 F | OXYGEN SATURATION: 93 % | SYSTOLIC BLOOD PRESSURE: 164 MMHG

## 2018-03-02 VITALS
HEART RATE: 71 BPM | TEMPERATURE: 100.58 F | DIASTOLIC BLOOD PRESSURE: 81 MMHG | SYSTOLIC BLOOD PRESSURE: 149 MMHG | OXYGEN SATURATION: 93 %

## 2018-03-02 VITALS
SYSTOLIC BLOOD PRESSURE: 157 MMHG | DIASTOLIC BLOOD PRESSURE: 87 MMHG | OXYGEN SATURATION: 90 % | TEMPERATURE: 99.68 F | HEART RATE: 83 BPM

## 2018-03-02 VITALS
HEART RATE: 72 BPM | OXYGEN SATURATION: 96 % | TEMPERATURE: 97.7 F | DIASTOLIC BLOOD PRESSURE: 86 MMHG | SYSTOLIC BLOOD PRESSURE: 149 MMHG

## 2018-03-02 VITALS — OXYGEN SATURATION: 90 %

## 2018-03-02 VITALS
TEMPERATURE: 99.86 F | HEART RATE: 67 BPM | SYSTOLIC BLOOD PRESSURE: 146 MMHG | DIASTOLIC BLOOD PRESSURE: 87 MMHG | OXYGEN SATURATION: 94 %

## 2018-03-02 VITALS
OXYGEN SATURATION: 92 % | HEART RATE: 71 BPM | DIASTOLIC BLOOD PRESSURE: 71 MMHG | SYSTOLIC BLOOD PRESSURE: 130 MMHG | TEMPERATURE: 99.68 F

## 2018-03-02 VITALS
OXYGEN SATURATION: 93 % | HEART RATE: 72 BPM | DIASTOLIC BLOOD PRESSURE: 86 MMHG | SYSTOLIC BLOOD PRESSURE: 144 MMHG | TEMPERATURE: 98.78 F

## 2018-03-02 VITALS — TEMPERATURE: 98.96 F

## 2018-03-02 LAB
ALBUMIN SERPL-MCNC: 3 GM/DL (ref 3.4–5)
ALP SERPL-CCNC: 59 U/L (ref 45–117)
ALT SERPL-CCNC: 29 U/L (ref 12–78)
AST SERPL-CCNC: 51 U/L (ref 15–37)
BUN SERPL-MCNC: 7 MG/DL (ref 7–18)
CALCIUM SERPL-MCNC: 8 MG/DL (ref 8.5–10.1)
CO2 SERPL-SCNC: 26 MMOL/L (ref 21–32)
CREAT SERPL-MCNC: 0.65 MG/DL (ref 0.6–1.2)
GLUCOSE SERPL-MCNC: 82 MG/DL (ref 70–99)
LIPASE: 355 U/L (ref 73–393)
POTASSIUM SERPL-SCNC: 3.1 MMOL/L (ref 3.5–5.1)
PROT SERPL-MCNC: 6.4 GM/DL (ref 6.4–8.2)
SODIUM SERPL-SCNC: 137 MMOL/L (ref 136–145)

## 2018-03-02 RX ADMIN — METOPROLOL TARTRATE SCH MG: 25 TABLET, FILM COATED ORAL at 20:39

## 2018-03-02 RX ADMIN — MORPHINE SULFATE PRN MG: 2 INJECTION, SOLUTION INTRAMUSCULAR; INTRAVENOUS at 07:59

## 2018-03-02 RX ADMIN — Medication SCH MG: at 08:00

## 2018-03-02 RX ADMIN — METOPROLOL TARTRATE SCH MG: 25 TABLET, FILM COATED ORAL at 08:00

## 2018-03-02 RX ADMIN — ENOXAPARIN SODIUM SCH MG: 40 INJECTION SUBCUTANEOUS at 20:40

## 2018-03-02 RX ADMIN — SODIUM CHLORIDE SCH MLS/HR: 900 INJECTION, SOLUTION INTRAVENOUS at 02:14

## 2018-03-02 RX ADMIN — OSELTAMIVIR PHOSPHATE SCH MG: 75 CAPSULE ORAL at 08:00

## 2018-03-02 RX ADMIN — OSELTAMIVIR PHOSPHATE SCH MG: 75 CAPSULE ORAL at 20:40

## 2018-03-02 NOTE — PROGRESS NOTE
Subjective


Date of Service:


Mar 2, 2018.


Subjective


Pt evaluation today including:  conversation w/ patient, conversation w/ family

, physical exam, chart review, lab review, review of studies, conversation w/ 

consultant, review of inpatient medication list


Still cough with some whitish sputum,  easier  to cough up, was having mild 

fever last night, denies chest pain


No vomiting however no appetite, has tried some clear liquid seems tolerating 

fair





Problem List


Medical Problems:


(1) Diarrhea


Status: Acute  





(2) GI bleed


Status: Acute  





(3) Influenza


Status: Acute  





(4) Nausea


Status: Acute  





(5) Pancreatitis


Status: Acute  





(6) Vomiting


Status: Acute  








Review of Systems


Constitutional:  + weakness, + fatigue, No fever, No chills, No sweats, No 

weight loss, No problem reported


Eyes:  No worsening of vision, No eye pain, No redness, No discharge, No 

diplopia


ENT:  No hearing loss, No unusual epistaxis, No nasal symptoms, No sore throat, 

No tinnitus, No dental problems, No trouble swallowing


Respiratory:  + cough, + wheezing, No sputum, No shortness of breath, No 

dyspnea on exertion, No dyspnea at rest, No hemoptysis


Cardiac:  No chest pain, No orthopnea, No PND, No edema, No claudication, No 

palpitations


Abdomen:  + nausea, No pain, No vomiting, No diarrhea, No constipation


Musculoskeletal:  No joint pain, No muscle pain, No swelling, No calf pain


Female :  No dysuria, No urinary frequency, No hematuria, No incontinence, No 

abnormal vaginal bleeding, No vaginal discharge


Neurologic:  No memory loss, No paralysis, No weakness, No numbness/tingling, 

No vertigo, No balance problems


Psychiatric:  No depression symptoms, No anhedonism, No anxiety, No insomnia, 

No substance abuse


Heme:  No abnormal bleeding/bruising, No clotting problems, No swollen lymph 

nodes, No night sweats


Endo:  No fatigue, No excessive thirst, No excessive urination


Skin:  No rash, No itch, No new/changing skin lesions, No color change, No 

bleeding





Objective


Vital Signs











  Date Time  Temp Pulse Resp B/P (MAP) Pulse Ox O2 Delivery O2 Flow Rate FiO2


 


3/2/18 12:00     90 Room Air  


 


3/2/18 11:32 37.7 67 17 146/87 (106) 94   


 


3/2/18 08:00     90 Room Air  


 


3/2/18 07:19 37.6 83 18 157/87 (110) 90 Room Air  


 


3/2/18 04:57 37.6 71 16 130/71 (90) 92 Room Air  


 


3/2/18 04:00      Room Air  


 


3/2/18 00:01 37.4 70 18 164/94 (117) 93   


 


3/2/18 00:00      Room Air  


 


3/1/18 20:00      Room Air  


 


3/1/18 18:01 37.8 72 16 155/89 (111) 92 Room Air  


 


3/1/18 16:00     93 Room Air  











Physical Exam


General Appearance:  WD/WN, no apparent distress


Eyes:  normal inspection, PERRL, EOMI, sclerae normal


ENT:  normal ENT inspection, hearing grossly normal, pharynx normal


Neck:  supple, no adenopathy, thyroid normal, no JVD, no carotid bruits, 

trachea midline


Respiratory/Chest:  chest non-tender, normal breath sounds, no respiratory 

distress, no accessory muscle use, + decreased breath sounds, + wheezing


Cardiovascular:  regular rate, rhythm, no edema, no gallop, no JVD, no murmur


Abdomen:  normal bowel sounds, non tender, soft, no organomegaly, no pulsatile 

mass


Extremities:  normal range of motion, non-tender, normal inspection, no pedal 

edema, no calf tenderness, normal capillary refill, pelvis stable


Neurologic/Psychiatric:  CNs II-XII nml as tested, no motor/sensory deficits, 

alert, normal mood/affect, oriented x 3


Skin:  normal color, warm/dry, no rash


Lymphatic:  no adenopathy





Laboratory Results





Last 24 Hours








Test


  3/2/18


06:54


 


Sodium Level 137 mmol/L 


 


Potassium Level 3.1 mmol/L 


 


Chloride Level 103 mmol/L 


 


Carbon Dioxide Level 26 mmol/L 


 


Anion Gap 8.0 mmol/L 


 


Blood Urea Nitrogen 7 mg/dl 


 


Creatinine 0.65 mg/dl 


 


Est Creatinine Clear Calc


Drug Dose 88.7 ml/min 


 


 


Estimated GFR (


American) 112.6 


 


 


Estimated GFR (Non-


American 97.2 


 


 


BUN/Creatinine Ratio 10.3 


 


Random Glucose 82 mg/dl 


 


Calcium Level 8.0 mg/dl 


 


Total Bilirubin 0.3 mg/dl 


 


Direct Bilirubin < 0.1 mg/dl 


 


Aspartate Amino Transf


(AST/SGOT) 51 U/L 


 


 


Alanine Aminotransferase


(ALT/SGPT) 29 U/L 


 


 


Alkaline Phosphatase 59 U/L 


 


Total Protein 6.4 gm/dl 


 


Albumin 3.0 gm/dl 


 


Globulin 3.4 gm/dl 


 


Albumin/Globulin Ratio 0.9 


 


Lipase 355 U/L 











Assessment and Plan


57-year-old female admitted because of acute pancreatitis and influenza: Stable 

and improving





Acute pancreatitis, likely secondary to Influenza, stable improving, abdominal 

CT unremarkable, 


I ordered a right upper quadrant ultrasound to investigate  the biliary tract 

system because of patient complaint right upper quadrant abdominal pain, 


Right upper quadrant ultrasound was unremarkable 


continue supportive care, IV fluid, rest, pain control, 


Increase activities, advance diet as tolerated 


cont Tamiflu 75 mg BID x5 days- last day of treatment on 4/5 days.


Possible discharge home tomorrow if able to tolerate diet and if continue to be 

stable


Continued [facility] stay due to:  home environment unsafe for pt


Discharge planning:  home

## 2018-03-03 VITALS
HEART RATE: 72 BPM | SYSTOLIC BLOOD PRESSURE: 155 MMHG | TEMPERATURE: 98.96 F | OXYGEN SATURATION: 90 % | DIASTOLIC BLOOD PRESSURE: 74 MMHG

## 2018-03-03 VITALS
OXYGEN SATURATION: 91 % | DIASTOLIC BLOOD PRESSURE: 89 MMHG | TEMPERATURE: 98.6 F | SYSTOLIC BLOOD PRESSURE: 160 MMHG | HEART RATE: 62 BPM

## 2018-03-03 VITALS
SYSTOLIC BLOOD PRESSURE: 160 MMHG | TEMPERATURE: 98.06 F | DIASTOLIC BLOOD PRESSURE: 89 MMHG | OXYGEN SATURATION: 90 % | HEART RATE: 67 BPM

## 2018-03-03 VITALS
TEMPERATURE: 98.6 F | DIASTOLIC BLOOD PRESSURE: 89 MMHG | SYSTOLIC BLOOD PRESSURE: 160 MMHG | OXYGEN SATURATION: 91 % | HEART RATE: 62 BPM

## 2018-03-03 LAB
ALBUMIN SERPL-MCNC: 2.8 GM/DL (ref 3.4–5)
ALP SERPL-CCNC: 50 U/L (ref 45–117)
ALT SERPL-CCNC: 36 U/L (ref 12–78)
AST SERPL-CCNC: 51 U/L (ref 15–37)
BUN SERPL-MCNC: 7 MG/DL (ref 7–18)
CALCIUM SERPL-MCNC: 8.1 MG/DL (ref 8.5–10.1)
CO2 SERPL-SCNC: 28 MMOL/L (ref 21–32)
CREAT SERPL-MCNC: 0.58 MG/DL (ref 0.6–1.2)
EOSINOPHIL NFR BLD AUTO: 203 K/UL (ref 130–400)
GLUCOSE SERPL-MCNC: 118 MG/DL (ref 70–99)
HCT VFR BLD CALC: 36.4 % (ref 37–47)
HGB BLD-MCNC: 12.5 G/DL (ref 12–16)
LIPASE: 242 U/L (ref 73–393)
MCH RBC QN AUTO: 29.2 PG (ref 25–34)
MCHC RBC AUTO-ENTMCNC: 34.3 G/DL (ref 32–36)
MCV RBC AUTO: 85 FL (ref 80–100)
PMV BLD AUTO: 9.6 FL (ref 7.4–10.4)
POTASSIUM SERPL-SCNC: 3.2 MMOL/L (ref 3.5–5.1)
PROT SERPL-MCNC: 6 GM/DL (ref 6.4–8.2)
RED CELL DISTRIBUTION WIDTH CV: 13 % (ref 11.5–14.5)
RED CELL DISTRIBUTION WIDTH SD: 40.6 FL (ref 36.4–46.3)
SODIUM SERPL-SCNC: 138 MMOL/L (ref 136–145)
WBC # BLD AUTO: 4.27 K/UL (ref 4.8–10.8)

## 2018-03-03 RX ADMIN — METOPROLOL TARTRATE SCH MG: 25 TABLET, FILM COATED ORAL at 07:54

## 2018-03-03 RX ADMIN — Medication SCH MG: at 07:54

## 2018-03-03 RX ADMIN — OSELTAMIVIR PHOSPHATE SCH MG: 75 CAPSULE ORAL at 07:55

## 2018-03-03 NOTE — DISCHARGE SUMMARY
Discharge Summary


Date of Service


Mar 3, 2018.





Discharge Summary


Admission Date:


Feb 28, 2018 at 18:15


Discharge Date:  Mar 3, 2018


Discharge Disposition:  Home


Principal Diagnosis:  Acute Pancreatitis





Medication Reconciliation


New Medications:  


Oseltamivir Phosphate (Tamiflu) 75 Mg Cap


75 MG PO BID for 2 Days, #4 CAP





 


Continued Medications:  


Aspirin (Aspirin 81) 81 Mg Tab


81 MG PO DAILY











Discharge Exam


Review of Systems:  


   Constitutional:  No fever, No chills, No weakness, No fatigue


   Cardiovascular:  No chest pain, No edema


   Abdomen:  No pain, No nausea, No vomiting, No diarrhea, No constipation


   Musculoskeletal:  No muscle pain, No swelling


   Neurologic:  No memory loss, No paralysis, No weakness


   Endocrine:  No fatigue


   Hematologic / Lymphatic:  No abnormal bleeding/bruising


   Integumentary:  No rash


Physical Exam:  


   General Appearance:  WD/WN, no apparent distress, + obese


   Eyes:  EOMI


   Neck:  supple


   Respiratory/Chest:  chest non-tender, lungs clear, normal breath sounds


   Cardiovascular:  regular rate, rhythm, no edema, no gallop


   Abdomen / GI:  normal bowel sounds, non tender, soft, no organomegaly, no 

pulsatile mass, normal rectal exam


   Neurologic/Psychiatric:  CNs II-XII nml as tested, alert, normal mood/affect

, normal reflexes, oriented x 3


   Skin:  normal color, warm/dry, no rash


   Lymphatic:  no adenopathy





Hospital Course


57-year-old female admitted because of acute pancreatitis and influenza: Stable 

and improving





Acute pancreatitis, likely secondary to Influenza, stable improving, abdominal 

CT unremarkable, 


I ordered a right upper quadrant ultrasound to investigate  the biliary tract 

system because of patient complaint right upper quadrant abdominal pain, 


Right upper quadrant ultrasound was unremarkable. pt tolerated the diet very 

well.Pt denies any symptoms of abdominal pain, nausea, vominting and diarrhea. 

Pt lipase trended down ,


cont Tamiflu 75 mg BID x 2days- 


We will discharge patient home and advise to stay away from alcohol.


Total Time Spent:  Greater than 30 minutes


This includes examination of the patient, discharge planning, medication 

reconciliation, and communication with other providers.





Discharge Instructions


Please refer to the electronic Patient Visit Report (Discharge Instructions) 

for additional information.





Additional Copies To


Carlos Patino M.D.

## 2018-03-03 NOTE — DISCHARGE INSTRUCTIONS
Discharge Instructions


Date of Service


Mar 3, 2018.





Admission


Reason for Admission:  Acute Pancreatitis





Discharge


Discharge Diagnosis / Problem:  ACUTE PANCREATITIS, INFLUENZA A





Discharge Goals


Goal(s):  Decrease discomfort, Improve function, Improve disease control, 

Improve nutritional status, Learn about illness, Diagnostic testing, Prevent 

Disease Progression





Activity Recommendations


Activity Limitations:  as noted below


Lifting Limitations:  gradually increase as tolerated


Exercise/Sports Limitations:  rest today


May Resume Sexual Activity:  when tolerated


Shower/Bathe:  no limitations


Driving or Machine Use:  no limitations





.





Current Hospital Diet


Patient's current hospital diet: Low Fat Diet





Discharge Diet


Recommended Diet:  AHA Diet (Heart Healthy)





Pending Studies


Studies pending at discharge:  no





Laboratory Results





Hemoglobin A1c








Test


  3/1/18


06:36 Range/Units


 


 


Estimated Average Glucose 126   mg/dl


 


Hemoglobin A1c 6.0 H 4.5-5.6  %








Lipid Panel








Test


  2/28/18


15:54 Range/Units


 


 


Triglycerides Level 100  0-150  mg/dl


 


Cholesterol Level 123  0-200  mg/dl


 


HDL Cholesterol 31   mg/dl


 


Cholesterol/HDL Ratio 4.0   


 


LDL Cholesterol, Calculated 72   mg/dl











Medical Emergencies








.


Who to Call and When:





Medical Emergencies:  If at any time you feel your situation is an emergency, 

please call 911 immediately.





.





Non-Emergent Contact


Non-Emergency issues call your:  Primary Care Provider


Call Non-Emergent contact if:  temperature is above 101, your pain is not 

controlled





.


.








"Provider Documentation" section prepared by Sam Leung








.

## 2018-04-19 ENCOUNTER — HOSPITAL ENCOUNTER (OUTPATIENT)
Dept: HOSPITAL 45 - C.LAB1850 | Age: 58
Discharge: HOME | End: 2018-04-19
Attending: INTERNAL MEDICINE
Payer: COMMERCIAL

## 2018-04-19 DIAGNOSIS — E27.9: ICD-10-CM

## 2018-04-19 DIAGNOSIS — E55.9: Primary | ICD-10-CM

## 2018-04-19 DIAGNOSIS — I10: ICD-10-CM

## 2018-04-26 ENCOUNTER — HOSPITAL ENCOUNTER (OUTPATIENT)
Dept: HOSPITAL 45 - C.LAB1850 | Age: 58
Discharge: HOME | End: 2018-04-26
Attending: INTERNAL MEDICINE
Payer: COMMERCIAL

## 2018-04-26 DIAGNOSIS — E27.9: Primary | ICD-10-CM

## 2018-05-07 ENCOUNTER — HOSPITAL ENCOUNTER (OUTPATIENT)
Dept: HOSPITAL 45 - C.LABSPEC | Age: 58
Discharge: HOME | End: 2018-05-07
Attending: INTERNAL MEDICINE
Payer: COMMERCIAL

## 2018-05-07 DIAGNOSIS — E27.8: Primary | ICD-10-CM

## 2018-08-19 ENCOUNTER — HOSPITAL ENCOUNTER (EMERGENCY)
Dept: HOSPITAL 45 - C.EDB | Age: 58
Discharge: HOME | End: 2018-08-19
Payer: COMMERCIAL

## 2018-08-19 VITALS — SYSTOLIC BLOOD PRESSURE: 164 MMHG | HEART RATE: 76 BPM | OXYGEN SATURATION: 97 % | DIASTOLIC BLOOD PRESSURE: 116 MMHG

## 2018-08-19 VITALS — TEMPERATURE: 97.7 F

## 2018-08-19 DIAGNOSIS — I10: ICD-10-CM

## 2018-08-19 DIAGNOSIS — R10.9: Primary | ICD-10-CM

## 2018-08-19 DIAGNOSIS — R11.2: ICD-10-CM

## 2018-08-19 DIAGNOSIS — Z79.82: ICD-10-CM

## 2018-08-19 LAB
ALBUMIN SERPL-MCNC: 4.1 GM/DL (ref 3.4–5)
ALP SERPL-CCNC: 87 U/L (ref 45–117)
ALT SERPL-CCNC: 21 U/L (ref 12–78)
AST SERPL-CCNC: 18 U/L (ref 15–37)
BASOPHILS # BLD: 0.03 K/UL (ref 0–0.2)
BASOPHILS NFR BLD: 0.2 %
BUN SERPL-MCNC: 14 MG/DL (ref 7–18)
CA-I BLD-SCNC: 1.09 MMOL/L (ref 1.12–1.32)
CALCIUM SERPL-MCNC: 9.2 MG/DL (ref 8.5–10.1)
CO2 SERPL-SCNC: 21 MMOL/L (ref 21–32)
CREAT BLD-MCNC: 0.7 MG/DL (ref 0.6–1.3)
CREAT SERPL-MCNC: 0.92 MG/DL (ref 0.6–1.2)
EOS ABS #: 0.05 K/UL (ref 0–0.5)
EOSINOPHIL NFR BLD AUTO: 344 K/UL (ref 130–400)
GLUCOSE SERPL-MCNC: 180 MG/DL (ref 70–99)
HCT VFR BLD CALC: 42.3 % (ref 37–47)
HGB BLD-MCNC: 14.4 G/DL (ref 12–16)
IG#: 0.05 K/UL (ref 0–0.02)
IMM GRANULOCYTES NFR BLD AUTO: 13.1 %
ISTAT POTASSIUM: 3.5 MEQ/L (ref 3.3–5)
LIPASE: 165 U/L (ref 73–393)
LYMPHOCYTES # BLD: 2.1 K/UL (ref 1.2–3.4)
MCH RBC QN AUTO: 29.1 PG (ref 25–34)
MCHC RBC AUTO-ENTMCNC: 34 G/DL (ref 32–36)
MCV RBC AUTO: 85.6 FL (ref 80–100)
MONO ABS #: 0.51 K/UL (ref 0.11–0.59)
MONOCYTES NFR BLD: 3.2 %
NEUT ABS #: 13.25 K/UL (ref 1.4–6.5)
NEUTROPHILS # BLD AUTO: 0.3 %
NEUTROPHILS NFR BLD AUTO: 82.9 %
PMV BLD AUTO: 10.1 FL (ref 7.4–10.4)
POTASSIUM SERPL-SCNC: 3.5 MMOL/L (ref 3.5–5.1)
PROT SERPL-MCNC: 7.8 GM/DL (ref 6.4–8.2)
RED CELL DISTRIBUTION WIDTH CV: 13.3 % (ref 11.5–14.5)
RED CELL DISTRIBUTION WIDTH SD: 41.8 FL (ref 36.4–46.3)
SODIUM BLD-SCNC: 142 MEQ/L (ref 135–144)
SODIUM SERPL-SCNC: 139 MMOL/L (ref 136–145)
WBC # BLD AUTO: 15.99 K/UL (ref 4.8–10.8)

## 2018-08-19 RX ADMIN — HYDROMORPHONE HYDROCHLORIDE PRN MG: 1 INJECTION, SOLUTION INTRAMUSCULAR; INTRAVENOUS; SUBCUTANEOUS at 10:32

## 2018-08-19 RX ADMIN — HYDROMORPHONE HYDROCHLORIDE PRN MG: 1 INJECTION, SOLUTION INTRAMUSCULAR; INTRAVENOUS; SUBCUTANEOUS at 08:25

## 2018-08-19 NOTE — DIAGNOSTIC IMAGING REPORT
CT OF THE ABDOMEN AND PELVIS WITH CONTRAST



CLINICAL HISTORY: Mid abdominal pain.    



COMPARISON STUDY:  CT of the abdomen and pelvis February 28, 2018 and right

upper quadrant ultrasound March 1, 2018.



TECHNIQUE: Following IV administration of 93 mL of Optiray-320, axial images of

the abdomen and pelvis were obtained from the lung bases to the proximal femurs.

Images were reviewed in the axial, sagittal, and coronal planes. IV contrast was

administered without complication.  A dose lowering technique was utilized

adhering to the principles of ALARA.





CT DOSE: 891.13 mGy.cm



FINDINGS: A small hiatal hernia is noted. A 2 cm left adrenal nodule is

unchanged and CT of August 12, 2016. This is likely benign given stability. Exam

is mildly compromised by motion artifact. There is no peripancreatic or

pericholecystic infiltration. No biliary or pancreatic ductal dilatation is

present. The spleen is normal. There is mild bilateral collecting system

dilatation, greater on the right. Bladder is mildly distended. There is mild

bladder wall thickening. No pneumatosis, free air or portal venous gas is

present. There is mild wall thickening of the a sending colon and proximal

transverse colon. There is no evidence for a bowel obstruction. The appendix is

not well visualized on this exam. There is no right lower quadrant inflammation.







IMPRESSION:  



1. Appendix not well visualized on this exam but no right lower quadrant

inflammation. 



2. Mild wall thickening of the ascending colon and transverse colon which is

likely due to underdistention although a nonspecific mild colitis could appear

similar.



3. Mild bladder wall thickening which could be correlated with urinalysis to

exclude cystitis.



4. Mild bilateral collecting system dilatation, greater on the right. This may

be related to mild bladder distention.







Electronically signed by:  Capo Palacios M.D.

8/19/2018 9:43 AM



Dictated Date/Time:  8/19/2018 9:28 AM

## 2018-08-19 NOTE — EMERGENCY ROOM VISIT NOTE
History


Report prepared by Debi:  Polo Keller


Under the Supervision of:  Dr. Adalberto Jean M.D.


First contact with patient:  07:58


Chief Complaint:  ABDOMINAL PAIN


Stated Complaint:  ABD PAIN





History of Present Illness


The patient is a 57 year old female who presents to the Emergency Room with 

abdominal pain. The patient is complaining of severe and constant abdominal 

pain since this morning. The patient notes that she does have a history of 

abdominal surgeries. She states that she has received Morphine in the past for 

pain when she has had similar pain. She denies any alcohol abuse, history of 

smoking, or drug abuse.  Pain is generalized.  No melena hematochezia dysuria 

hematuria.  Reports nausea and vomiting.  No hematemesis bilious vomiting or 

coffee-ground emesis.





   Source of History:  patient


   Onset:  This morning


   Position:  abdomen


   Symptom Intensity:  severe


   Timing:  constant





Review of Systems


See HPI for pertinent positives and negatives.  A total of ten systems were 

reviewed and were otherwise negative.





Past Medical & Surgical


Medical Problems:


(1) Acute pancreatitis


(2) Gastroenteritis


(3) Hypertension








Family History





FH: arthritis


FH: stroke





Social History


Smoking Status:  Unknown if Ever Smoked


Drug Use:  none


Marital Status:  


Housing Status:  lives with significant other





Current/Historical Medications


Scheduled


Aspirin (Aspirin 81), 81 MG PO DAILY


Cholecalciferol (Vitamin D3), 1 TAB PO DAILY





Allergies


Coded Allergies:  


     Hydroquinone (Verified  Allergy, Severe, BLISTER, 8/19/18)


 AFFECTS HER AT "20%"


     Hydrocortisone (Verified  Allergy, Unknown, BLISTERS, 8/19/18)


     Latex1 -Allergic Contact Dermititis (Verified  Allergy, Unknown, BLISTERS, 

8/19/18)


     Neomycin (Verified  Allergy, Unknown, BLISTERS, 8/19/18)


Uncoded Allergies:  


     AMMONIUM THIOGLYCOLATE (Allergy, Severe, BLISTERS, 5/14/18)


 COMPONENT OF LATEX





Physical Exam


Vital Signs











  Date Time  Temp Pulse Resp B/P (MAP) Pulse Ox O2 Delivery O2 Flow Rate FiO2


 


8/19/18 11:42  76 18 164/116 97   


 


8/19/18 11:25    157/134    


 


8/19/18 11:16  87 22 246/125 98 Room Air  


 


8/19/18 09:18  76 16 219/110 96 Room Air  


 


8/19/18 08:44  74 20  100 Room Air  


 


8/19/18 08:16  66      


 


8/19/18 07:55 36.5 71 24 229/112 100 Room Air  











Physical Exam


Physical Exam 


GENERAL:  She is oriented to person, place, and time. Patient is distressed. 


HENT:  Exam performed. 


   Head:  Normocephalic and atraumatic. 


   Right Ear:  External ear normal. No mastoid tenderness. 


   Left Ear: External ear normal. No mastoid tenderness. 


   Mouth/Throat:  The oropharynx is clear and moist. No trismus in the jaw. No 

dental abscesses or uvula swelling. No oropharyngeal exudate or tonsillar 

abscesses.


EYES: Conjunctivae and EOM are normal. Pupils are equal, round, and reactive to 

light. Right eye exhibits no discharge. Left eye exhibits no discharge. No 

scleral icterus.


NECK: Normal range of motion. Neck supple. No JVD present. No spinous process 

tenderness present. No carotid bruit present. No rigidity. No tracheal 

deviation and normal range of motion present. No Brudzinski's sign and no Kernig

's sign noted.


CV: Normal rate, regular rhythm, normal heart sounds and intact distal pulses. 

There is no peripheral edema. Palpable radial pulses bue.


PULM/CHEST:  Effort normal and breath sounds normal. No respiratory distress. 

No stridor. She has no wheezes. She has no rales. 


   Chest Wall:  She exhibits no tenderness.


ABD: The abdomen is soft. Bowel sounds are normal. She has no distension. No 

mass is present. There diffuse abdominal pain on palpation. There is no rebound

, no guarding, no Bryan's sign and no tenderness at McBurney's point. Rovsig 

negative


MUSC/SKEL: Normal range of motion. There is no peripheral edema, tenderness or 

deformity.


LYMPH: No cervical adenopathy.


NEURO: She is alert and oriented to person, place, and time. She has normal 

strength. No cranial nerve deficit or sensory deficit. Coordination and gait 

normal. GCS eye subscore is 4. GCS verbal subscore is 5. GCS motor subscore is 

6. Cerebellar tests wnl.


SKIN: Skin is warm and dry. She is not diaphoretic.


PSYCH: She has a normal mood and affect. Behavior is normal. Judgment and 

thought content normal.





Medical Decision & Procedures


ER Provider


Diagnostic Interpretation:


Radiology results as stated below per my review and radiologist interpretation: 





CT OF THE ABDOMEN AND PELVIS WITH CONTRAST





CLINICAL HISTORY: Mid abdominal pain.    





COMPARISON STUDY:  CT of the abdomen and pelvis February 28, 2018 and right


upper quadrant ultrasound March 1, 2018.





TECHNIQUE: Following IV administration of 93 mL of Optiray-320, axial images of


the abdomen and pelvis were obtained from the lung bases to the proximal femurs.


Images were reviewed in the axial, sagittal, and coronal planes. IV contrast was


administered without complication.  A dose lowering technique was utilized


adhering to the principles of ALARA.








CT DOSE: 891.13 mGy.cm





FINDINGS: A small hiatal hernia is noted. A 2 cm left adrenal nodule is


unchanged and CT of August 12, 2016. This is likely benign given stability. Exam


is mildly compromised by motion artifact. There is no peripancreatic or


pericholecystic infiltration. No biliary or pancreatic ductal dilatation is


present. The spleen is normal. There is mild bilateral collecting system


dilatation, greater on the right. Bladder is mildly distended. There is mild


bladder wall thickening. No pneumatosis, free air or portal venous gas is


present. There is mild wall thickening of the a sending colon and proximal


transverse colon. There is no evidence for a bowel obstruction. The appendix is


not well visualized on this exam. There is no right lower quadrant inflammation.











IMPRESSION:  





1. Appendix not well visualized on this exam but no right lower quadrant


inflammation. 





2. Mild wall thickening of the ascending colon and transverse colon which is


likely due to underdistention although a nonspecific mild colitis could appear


similar.





3. Mild bladder wall thickening which could be correlated with urinalysis to


exclude cystitis.





4. Mild bilateral collecting system dilatation, greater on the right. This may


be related to mild bladder distention.











Electronically signed by:  Capo Palacios M.D.


8/19/2018 9:43 AM





Dictated Date/Time:  8/19/2018 9:28 AM








CHEST ONE VIEW PORTABLE





CLINICAL HISTORY: Epigastric pain.    





COMPARISON STUDY:  Chest radiograph February 28, 2018.





FINDINGS: Lung volumes are normal. No pneumothorax or pleural effusion is noted.


There is no consolidation. Cardiac size is normal. Mediastinal contours are


normal. There is no evidence for pulmonary edema.





IMPRESSION:  No acute cardiopulmonary findings. 














Electronically signed by:  Capo Palacios M.D.


8/19/2018 8:27 AM





Dictated Date/Time:  8/19/2018 8:26 AM





Laboratory Results


8/19/18 08:05








Red Blood Count 4.94, Mean Corpuscular Volume 85.6, Mean Corpuscular Hemoglobin 

29.1, Mean Corpuscular Hemoglobin Concent 34.0, Mean Platelet Volume 10.1, 

Neutrophils (%) (Auto) 82.9, Lymphocytes (%) (Auto) 13.1, Monocytes (%) (Auto) 

3.2, Eosinophils (%) (Auto) 0.3, Basophils (%) (Auto) 0.2, Neutrophils # (Auto) 

13.25, Lymphocytes # (Auto) 2.10, Monocytes # (Auto) 0.51, Eosinophils # (Auto) 

0.05, Basophils # (Auto) 0.03





8/19/18 08:05

















Test


  8/19/18


08:05 8/19/18


08:15 8/19/18


08:30 8/19/18


10:00


 


White Blood Count


  15.99 K/uL


(4.8-10.8) 


  


  


 


 


Red Blood Count


  4.94 M/uL


(4.2-5.4) 


  


  


 


 


Hemoglobin


  14.4 g/dL


(12.0-16.0) 


  


  


 


 


Hematocrit 42.3 % (37-47)    


 


Mean Corpuscular Volume


  85.6 fL


() 


  


  


 


 


Mean Corpuscular Hemoglobin


  29.1 pg


(25-34) 


  


  


 


 


Mean Corpuscular Hemoglobin


Concent 34.0 g/dl


(32-36) 


  


  


 


 


Platelet Count


  344 K/uL


(130-400) 


  


  


 


 


Mean Platelet Volume


  10.1 fL


(7.4-10.4) 


  


  


 


 


Neutrophils (%) (Auto) 82.9 %    


 


Lymphocytes (%) (Auto) 13.1 %    


 


Monocytes (%) (Auto) 3.2 %    


 


Eosinophils (%) (Auto) 0.3 %    


 


Basophils (%) (Auto) 0.2 %    


 


Neutrophils # (Auto)


  13.25 K/uL


(1.4-6.5) 


  


  


 


 


Lymphocytes # (Auto)


  2.10 K/uL


(1.2-3.4) 


  


  


 


 


Monocytes # (Auto)


  0.51 K/uL


(0.11-0.59) 


  


  


 


 


Eosinophils # (Auto)


  0.05 K/uL


(0-0.5) 


  


  


 


 


Basophils # (Auto)


  0.03 K/uL


(0-0.2) 


  


  


 


 


RDW Standard Deviation


  41.8 fL


(36.4-46.3) 


  


  


 


 


RDW Coefficient of Variation


  13.3 %


(11.5-14.5) 


  


  


 


 


Immature Granulocyte % (Auto) 0.3 %    


 


Immature Granulocyte # (Auto)


  0.05 K/uL


(0.00-0.02) 


  


  


 


 


Estimated GFR (


American) 79.0 


  


  


  


 


 


Estimated GFR (Non-


American 68.2 


  


  


  


 


 


BUN/Creatinine Ratio 15.1 (10-20)    


 


Calcium Level


  9.2 mg/dl


(8.5-10.1) 


  


  


 


 


Total Bilirubin


  0.4 mg/dl


(0.2-1) 


  


  


 


 


Direct Bilirubin


  0.1 mg/dl


(0-0.2) 


  


  


 


 


Aspartate Amino Transf


(AST/SGOT) 18 U/L (15-37) 


  


  


  


 


 


Alanine Aminotransferase


(ALT/SGPT) 21 U/L (12-78) 


  


  


  


 


 


Alkaline Phosphatase


  87 U/L


() 


  


  


 


 


Total Protein


  7.8 gm/dl


(6.4-8.2) 


  


  


 


 


Albumin


  4.1 gm/dl


(3.4-5.0) 


  


  


 


 


Lipase


  165 U/L


() 


  


  


 


 


Bedside Hemoglobin


  


  15.3 g/dl


(12.0-16.0) 


  


 


 


Bedside Hematocrit  45 % (37-47)   


 


Bedside Sodium


  


  142 mEq/L


(135-144) 


  


 


 


Bedside Potassium


  


  3.5 mEq/L


(3.3-5.0) 


  


 


 


Bedside Chloride


  


  105 mEq/L


(101-112) 


  


 


 


Bedside Total CO2


  


  20 mEq/l


(24-31) 


  


 


 


Anion Gap


  


  21.0 mmol/L


(16-25) 


  


 


 


Bedside Blood Urea Nitrogen


  


  14 mg/dl


(7-18) 


  


 


 


Bedside Creatinine


  


  0.7 mg/dl


(0.6-1.3) 


  


 


 


Bedside Glucose (other)


  


  184 mg/dl


(70-99) 


  


 


 


Bedside Ionized Calcium (Tamera)


  


  1.09 mmol/l


(1.12-1.32) 


  


 


 


Lactic Acid Level


  


  


  3.8 mmol/L


(0.4-2.0) 


 


 


Urine Color    YELLOW 


 


Urine Appearance    CLEAR (CLEAR) 


 


Urine pH    8.0 (4.5-7.5) 


 


Urine Specific Gravity


  


  


  


  1.023


(1.000-1.030)


 


Urine Protein    NEG (NEG) 


 


Urine Glucose (UA)    NEG (NEG) 


 


Urine Ketones    NEG (NEG) 


 


Urine Occult Blood    NEG (NEG) 


 


Urine Nitrite    NEG (NEG) 


 


Urine Bilirubin    NEG (NEG) 


 


Urine Urobilinogen    NEG (NEG) 


 


Urine Leukocyte Esterase    NEG (NEG) 


 


Test


  8/19/18


11:25 


  


  


 


 


Bedside Lactic Acid Venous


  1.24 mmol/L


(0.90-1.70) 


  


  


 





Laboratory results reviewed by me





Medications Administered











 Medications


  (Trade)  Dose


 Ordered  Sig/Maryam


 Route  Start Time


 Stop Time Status Last Admin


Dose Admin


 


 Sodium Chloride  1,000 ml @ 


 999 mls/hr  Q1H1M STAT


 IV  8/19/18 08:02


 8/19/18 09:02 DC 8/19/18 08:25


999 MLS/HR


 


 Hydromorphone HCl


  (Dilaudid Inj)  1 mg  NOW  PRN


 IV  8/19/18 08:15


 8/19/18 12:52 DC 8/19/18 10:32


1 MG


 


 Ondansetron HCl


  (Zofran Inj)  4 mg  NOW  STAT


 IV  8/19/18 08:02


 8/19/18 08:05 DC 8/19/18 08:25


4 MG


 


 Lorazepam


  (Ativan Inj)  1 mg  NOW  STAT


 IV  8/19/18 08:56


 8/19/18 08:58 DC 8/19/18 09:03


1 MG


 


 Sodium Chloride  1,000 ml @ 


 999 mls/hr  Q1H1M STAT


 IV  8/19/18 09:25


 8/19/18 10:25 DC 8/19/18 09:25


999 MLS/HR











ED Course


0759: The patient was evaluated in room B10. A complete history and physical 

exam was performed. 





0802: Ordered Zofran 4 mg IV, Sodium Chloride 1000 mL @ 999 mL/hr IV





0806: The patient's EMR was reviewed and shows a history of pancreatitis.





0815: Ordered Dilaudid 1 mg IV. 





0856: Ordered Lorazepam 1 mg IV. 





0925: Ordered Sodium Chloride 1000 mL @ 999 mL IV.  





1143: I checked on the patient at this time. Her vitals are stable at this time

, labs showed a leukocytosis of 16 and a Lactic acid of 3.8. CT scan was 

negative for appendicitis. Her gallbladder is WNL. Urine did not show any signs 

of infection. After fluid bolus and pain medications the patient states that 

she feels better.  Repeat abdominal exam showed no pain on palpation of the 

abdomen.  Lactic acid was normal on repeat and was thought to be due to 

dehydration. Will follow-up with PCP. The  states that he will drive her 

home. DISCHARGE - Plan of care discussed with patient and questions answered. 

The patient was given both verbal and printed discharge instructions. The 

patient verbalized understanding and ability to comply. The patient is to seek 

outpatient follow up as noted in the discharge instructions. The patient 

verbalized understanding and ability to comply. The patient is discharged in 

stable condition. The patient was instructed to return for worsening symptoms.





Medical Decision


vitals are stable at this time, labs showed a leukocytosis of 16 and a Lactic 

acid of 3.8. CT scan was negative for appendicitis. Her gallbladder is WNL. 

Urine did not show any signs of infection. After fluid bolus and pain 

medications the patient states that she feels better.  Repeat abdominal exam 

showed no pain on palpation of the abdomen.  Lactic acid was normal on repeat 

and was thought to be due to dehydration. Will follow-up with PCP. The  

states that he will drive her home. DISCHARGE - Plan of care discussed with 

patient and questions answered. The patient was given both verbal and printed 

discharge instructions. The patient verbalized understanding and ability to 

comply. The patient is to seek outpatient follow up as noted in the discharge 

instructions. The patient verbalized understanding and ability to comply. The 

patient is discharged in stable condition. The patient was instructed to return 

for worsening symptoms.





Medication Reconcilliation


Current Medication List:  was personally reviewed by me





Blood Pressure Screening


Patient's blood pressure:  Elevated blood pressure


Blood pressure disposition:  Referred to PCP





Impression





 Primary Impression:  


 Abdominal pain





Scribe Attestation


The scribe's documentation has been prepared under my direction and personally 

reviewed by me in its entirety. I confirm that the note above accurately 

reflects all work, treatment, procedures, and medical decision making performed 

by me.





The chart was completed utilizing Dragon Speech voice recognition software. 

Grammatical errors, random word insertions, pronoun errors, and incomplete 

sentences are an occasional consequence of this system due to software 

limitations, ambient noise, and hardware issues. Any formal questions or 

concerns about the content, text, or information contained within the body of 

this dictation should be directly addressed to the physician for clarification.





Departure Information


Dispostion


Home / Self-Care





Referrals


Carlos Patino M.D. (PCP)





Patient Instructions


My [facility]





Problem Qualifiers








 Primary Impression:  


 Abdominal pain


 Abdominal location:  unspecified location  Qualified Codes:  R10.9 - 

Unspecified abdominal pain

## 2018-08-19 NOTE — DIAGNOSTIC IMAGING REPORT
CHEST ONE VIEW PORTABLE



CLINICAL HISTORY: Epigastric pain.    



COMPARISON STUDY:  Chest radiograph February 28, 2018.



FINDINGS: Lung volumes are normal. No pneumothorax or pleural effusion is noted.

There is no consolidation. Cardiac size is normal. Mediastinal contours are

normal. There is no evidence for pulmonary edema.



IMPRESSION:  No acute cardiopulmonary findings. 









Electronically signed by:  Capo Palacios M.D.

8/19/2018 8:27 AM



Dictated Date/Time:  8/19/2018 8:26 AM

## 2018-08-20 ENCOUNTER — HOSPITAL ENCOUNTER (INPATIENT)
Dept: HOSPITAL 45 - C.EDB | Age: 58
LOS: 2 days | Discharge: HOME | DRG: 439 | End: 2018-08-22
Attending: HOSPITALIST | Admitting: HOSPITALIST
Payer: COMMERCIAL

## 2018-08-20 VITALS
TEMPERATURE: 98.42 F | HEART RATE: 65 BPM | SYSTOLIC BLOOD PRESSURE: 121 MMHG | OXYGEN SATURATION: 98 % | DIASTOLIC BLOOD PRESSURE: 67 MMHG

## 2018-08-20 VITALS
HEIGHT: 62 IN | WEIGHT: 149.91 LBS | WEIGHT: 149.91 LBS | HEIGHT: 62 IN | BODY MASS INDEX: 27.59 KG/M2 | BODY MASS INDEX: 27.59 KG/M2

## 2018-08-20 VITALS
SYSTOLIC BLOOD PRESSURE: 151 MMHG | DIASTOLIC BLOOD PRESSURE: 91 MMHG | HEART RATE: 64 BPM | OXYGEN SATURATION: 98 % | TEMPERATURE: 98.24 F

## 2018-08-20 DIAGNOSIS — D35.02: ICD-10-CM

## 2018-08-20 DIAGNOSIS — Z91.040: ICD-10-CM

## 2018-08-20 DIAGNOSIS — E83.42: ICD-10-CM

## 2018-08-20 DIAGNOSIS — E87.6: ICD-10-CM

## 2018-08-20 DIAGNOSIS — I10: ICD-10-CM

## 2018-08-20 DIAGNOSIS — K85.90: Primary | ICD-10-CM

## 2018-08-20 DIAGNOSIS — Z87.19: ICD-10-CM

## 2018-08-20 DIAGNOSIS — Z88.1: ICD-10-CM

## 2018-08-20 DIAGNOSIS — I25.10: ICD-10-CM

## 2018-08-20 DIAGNOSIS — Z79.82: ICD-10-CM

## 2018-08-20 DIAGNOSIS — R93.3: ICD-10-CM

## 2018-08-20 DIAGNOSIS — I24.8: ICD-10-CM

## 2018-08-20 DIAGNOSIS — Z88.8: ICD-10-CM

## 2018-08-20 DIAGNOSIS — E87.1: ICD-10-CM

## 2018-08-20 DIAGNOSIS — E78.5: ICD-10-CM

## 2018-08-20 LAB
ALBUMIN SERPL-MCNC: 4.4 GM/DL (ref 3.4–5)
ALP SERPL-CCNC: 85 U/L (ref 45–117)
ALT SERPL-CCNC: 21 U/L (ref 12–78)
AST SERPL-CCNC: 22 U/L (ref 15–37)
BASOPHILS # BLD: 0.02 K/UL (ref 0–0.2)
BASOPHILS NFR BLD: 0.1 %
BUN SERPL-MCNC: 10 MG/DL (ref 7–18)
BUN SERPL-MCNC: 14 MG/DL (ref 7–18)
CALCIUM SERPL-MCNC: 8.3 MG/DL (ref 8.5–10.1)
CALCIUM SERPL-MCNC: 9.2 MG/DL (ref 8.5–10.1)
CK MB SERPL-MCNC: 5.4 NG/ML (ref 0.5–3.6)
CO2 SERPL-SCNC: 20 MMOL/L (ref 21–32)
CO2 SERPL-SCNC: 26 MMOL/L (ref 21–32)
CREAT SERPL-MCNC: 0.71 MG/DL (ref 0.6–1.2)
CREAT SERPL-MCNC: 1.13 MG/DL (ref 0.6–1.2)
EOS ABS #: 0 K/UL (ref 0–0.5)
EOSINOPHIL NFR BLD AUTO: 362 K/UL (ref 130–400)
GLUCOSE SERPL-MCNC: 165 MG/DL (ref 70–99)
GLUCOSE SERPL-MCNC: 88 MG/DL (ref 70–99)
HCT VFR BLD CALC: 41.5 % (ref 37–47)
HGB BLD-MCNC: 14.9 G/DL (ref 12–16)
IG#: 0.08 K/UL (ref 0–0.02)
IMM GRANULOCYTES NFR BLD AUTO: 8.3 %
INR PPP: 1 (ref 0.9–1.1)
KETONES UR QL STRIP: 93 MG/DL
LIPASE: 2023 U/L (ref 73–393)
LYMPHOCYTES # BLD: 1.71 K/UL (ref 1.2–3.4)
MCH RBC QN AUTO: 29.9 PG (ref 25–34)
MCHC RBC AUTO-ENTMCNC: 35.9 G/DL (ref 32–36)
MCV RBC AUTO: 83.3 FL (ref 80–100)
MONO ABS #: 1.34 K/UL (ref 0.11–0.59)
MONOCYTES NFR BLD: 6.5 %
NEUT ABS #: 17.45 K/UL (ref 1.4–6.5)
NEUTROPHILS # BLD AUTO: 0 %
NEUTROPHILS NFR BLD AUTO: 84.7 %
PH UR: 160 MG/DL (ref 0–200)
PMV BLD AUTO: 10.3 FL (ref 7.4–10.4)
POTASSIUM SERPL-SCNC: 2.8 MMOL/L (ref 3.5–5.1)
POTASSIUM SERPL-SCNC: 3 MMOL/L (ref 3.5–5.1)
PROT SERPL-MCNC: 8.4 GM/DL (ref 6.4–8.2)
PTT PATIENT: 25.4 SECONDS (ref 21–31)
PTT PATIENT: 31.9 SECONDS (ref 21–31)
PTT PATIENT: 54.6 SECONDS (ref 21–31)
RED CELL DISTRIBUTION WIDTH CV: 13.4 % (ref 11.5–14.5)
RED CELL DISTRIBUTION WIDTH SD: 40.5 FL (ref 36.4–46.3)
SODIUM SERPL-SCNC: 133 MMOL/L (ref 136–145)
SODIUM SERPL-SCNC: 140 MMOL/L (ref 136–145)
WBC # BLD AUTO: 20.6 K/UL (ref 4.8–10.8)

## 2018-08-20 RX ADMIN — HEPARIN SODIUM SCH MLS/HR: 5000 INJECTION, SOLUTION INTRAVENOUS at 14:03

## 2018-08-20 RX ADMIN — PANTOPRAZOLE SODIUM SCH MLS/MIN: 40 INJECTION, POWDER, FOR SOLUTION INTRAVENOUS at 20:23

## 2018-08-20 RX ADMIN — HEPARIN SODIUM SCH MLS/HR: 5000 INJECTION, SOLUTION INTRAVENOUS at 14:53

## 2018-08-20 RX ADMIN — SODIUM CHLORIDE AND POTASSIUM CHLORIDE SCH MLS/HR: 9; 1.49 INJECTION, SOLUTION INTRAVENOUS at 20:24

## 2018-08-20 NOTE — ECHOCARDIOGRAM REPORT
*NOTICE TO RECEIVING PARTY AGENCY**  This information is strictly Confidential and protected under 
[place] law.  [place] law prohibits you from making any further disclosure of this 
information unless further disclosure is expressly permitted by the written consent of the person to 
whom it pertains or is authorized by law.  A general authorization for the release of medical or 
other information is not sufficient for this purpose.  Hospital accepts no responsibility if the 
information is made available to any other person, INCLUDING THE PATIENT.



Interpretation Summary

  *  Name: IRINA GARCIA  Study Date: 2018 07:46 AM  BP: 110/73 mmHg

  *  MRN: [id number]  Patient Location: C.EDB  HR: 78

  *  : 1960 (M/d/yyyy)  Gender: Female  Height: 62 in

  *  Age: 57 yrs  Ethnicity: [place]  Weight: 146 lb

  *  Ordering Physician: Ba Chacko

  *  Performed By: Blaire Venegas RDCS

  *  Accession# [id number]  Account# [id number]

  *  Reason For Study: CHEST PAIN

  *  BSA: 1.7 m2

  *  -- Conclusions --

  *  1. Normal LV size.  Borderline concentric LVH.

  *  2. LVEF >70%. No regional wall motion abnormalities.

  *  3. Normal RV size and function.

  *  4. No significant valvular pathology.

  *  5. No prior studies for comparison.

Procedure Details

  *  The study was technically difficult.

  *  There were technical limitations due to patient'sinability to cooperate

  *  A contrast injection of Definity was performed to improve assessment of LV function.

  *  Contrast was injected into an intravenous site in the left arm.

  *  One vial of Definity ultrasound contrast was diluted in normal saline to a total volume of 10 
ml.  A total of '2' ml of solution was administered during imaging.

  *  Lot # [id number] of Definity utilized for procedure.

  *  Expiration date .

Left Ventricle

  *  The left ventricle is grossly normal size.

  *  There is borderline concentric left ventricular hypertrophy.

  *  Ejection Fraction = >70 %.

  *  No regional wall motion abnormalities noted.

Right Ventricle

  *  The right ventricle is grossly normal size.

  *  The right ventricular systolic function is qualitatively normal.

Atria

  *  The left atrial size is normal.

  *  Right atrial size is normal.

  *  No ASD detected; PFO is not assessed.

Mitral Valve

  *  The mitral valve is grossly normal.

  *  There is no mitral valve stenosis.

  *  There is trace mitral regurgitation.

Tricuspid Valve

  *  Significant tricuspid regurgitation is absent.

Aortic Valve

  *  The aortic valve is not well visualized.

  *  Aortic stenosis is absent.

  *  There is no significant aortic regurgitation.

Pulmonic Valve

  *  The pulmonary valve is inadequately visualized, but the Doppler data is adequate for 
interpretation.

  *  Pulmonic stenosis is absent.

  *  There is no significant pulmonary regurgitation.

Great Vessels

  *  The aortic root and proximal ascending aorta are normal sized.

Pericardium/Pleural

  *  There is no pericardial effusion.



MMode 2D Measurements and Calculations

IVSd 1.2 cm

IVSs 1.8 cm



LVIDd 4.1 cm

LVIDs 2.4 cm

LVPWd 0.96 cm

LVPWs 1.7 cm



IVS/LVPW 1.2 

FS 40.4 %

EDV(Teich) 73.6 ml

ESV(Teich) 20.9 ml

EF(Teich) 71.5 %



EDV(cubed) 68.2 ml

ESV(cubed) 14.5 ml

EF(cubed) 78.8 %

% IVS thick 57.8 %

% LVPW thick 77.6 %





LV mass(C)d 143.1 grams

LV mass(C)dI 85.6 grams/m\S\2

LV mass(C)s 166.4 grams

LV mass(C)sI 99.5 grams/m\S\2



SV(Teich) 52.7 ml

SI(Teich) 31.5 ml/m\S\2

SV(cubed) 53.8 ml

SI(cubed) 32.1 ml/m\S\2



ACS 1.0 cm



LVOT diam 1.4 cm

LVOT area 1.4 cm\S\2





LVAd ap4 30.2 cm\S\2

LVLd ap4 7.5 cm

EDV(MOD-sp4) 94.6 ml

EDV(sp4-el) 102.6 ml

LVAs ap4 15.1 cm\S\2

LVLs ap4 5.9 cm

ESV(MOD-sp4) 32.7 ml

ESV(sp4-el) 33.1 ml

EF(MOD-sp4) 65.4 %

EF(sp4-el) 67.7 %



LVAd ap2 22.0 cm\S\2

LVLd ap2 7.1 cm

EDV(MOD-sp2) 56.9 ml

EDV(sp2-el) 57.9 ml

LVAs ap2 10.6 cm\S\2

LVLs ap2 5.2 cm

ESV(MOD-sp2) 20.7 ml

ESV(sp2-el) 18.4 ml

EF(MOD-sp2) 63.6 %

EF(sp2-el) 68.2 %



LVLd %diff -6.35 %

EDV(MOD-bp) 76.3 ml

LVLs %diff -13.32 %

ESV(MOD-bp) 27.1 ml

EF(MOD-bp) 64.4 %



SV(MOD-sp4) 61.9 ml

SI(MOD-sp4) 37.0 ml/m\S\2





SV(MOD-sp2) 36.2 ml

SI(MOD-sp2) 21.7 ml/m\S\2



SV(MOD-bp) 49.1 ml

SI(MOD-bp) 29.4 ml/m\S\2



SV(sp4-el) 69.4 ml

SI(sp4-el) 41.5 ml/m\S\2



SV(sp2-el) 39.5 ml

SI(sp2-el) 23.6 ml/m\S\2







Doppler Measurements and Calculations

MV E max walter 76.0 cm/sec

MV A max walter 97.1 cm/sec



MV E/A 0.78 



MV dec time 0.25 sec



Ao V2 max 132.7 cm/sec

Ao max PG 7.0 mmHg

Ao max PG (full) 2.1 mmHg

LOW(V,A) 1.2 cm\S\2

LOW(V,D) 1.2 cm\S\2





LV V1 max PG 4.9 mmHg



LV V1 max 110.9 cm/sec



PA V2 max 99.2 cm/sec

PA max PG 3.9 mmHg

## 2018-08-20 NOTE — DIAGNOSTIC IMAGING REPORT
(CHEST FOR PE) ANGIO WITH



HISTORY:  57 years-old Female with    .  Presents with acute epigastric

abdominal pain and acute chest pain with shortness of breath, nausea and

vomiting  



TECHNIQUE: Multiple CTA images of the chest were obtained after the intravenous

administration of 119 ml Optiray 320.  Coronal and sagittal MIPS were obtained

from the axial data set and were submitted for review.  A dose lowering

technique was utilized adhering to the principles of ALARA.



COMPARISON: CT abdomen and pelvis of same day, chest radiograph of same day



FINDINGS: 



CTA:  

Heart is normal in size without pericardial effusion. Coronary arterial

calcifications are noted. The thoracic aorta is normal in both course and

caliber without aneurysm or dissection. Calcification is noted at the origin of

the celiac trunk. The pulmonary arterial tree is opacified to level of the

proximal segmental branches. The segmental subsegmental branches are not well

dilated secondary to respiratory motion. No filling defects identified to

suggest pulmonary thromboembolic disease.

 

CT CHEST:  

No focal thyroid nodule identified. No pathologically enlarged lymph nodes are

identified. There is no pneumothorax or pleural effusion. Mild dependent

subsegmental groundglass densities suggest atelectasis. Evaluation of the lung

parenchyma is also mildly limited secondary to respiratory motion. The central

airways are patent.



There is mild circumferential wall thickening of the mid to distal esophagus

with small sliding-type hiatal hernia. Oral contrast is noted within the hernia

sac. There is moderate thickening about the bilateral adrenal glands, only

partially imaged. Please refer to CT abdomen and pelvis of same day for further

details. Calcifications are noted about the left breast. Multilevel spondylitic

spurring with intervertebral disc space narrowing about the spine.





IMPRESSION:  

1. No acute intrathoracic abnormality identified, specifically no acute aortic

pathology or evidence of pulmonary thromboembolic disease. Please note however

that the exam is limited secondary to respiratory motion. No central pulmonary

thromboembolic disease.

2. No focal airspace consolidation to suggest pneumonia.

3. Mild coronary arterial disease.

4. Mild wall thickening about the mid and distal esophagus with small

sliding-type hiatal hernia.



The above report was generated using voice recognition software. It may contain

grammatical, syntax or spelling errors.







Electronically signed by:  Juan David Darnell M.D.

8/20/2018 9:22 AM



Dictated Date/Time:  8/20/2018 9:12 AM

## 2018-08-20 NOTE — DIAGNOSTIC IMAGING REPORT
CHEST ONE VIEW PORTABLE



CLINICAL HISTORY: Atypical chest pain, nausea, vomiting.    



COMPARISON STUDY:  August 19, 2018



FINDINGS: The cardiac and mediastinal contours are normal. There is no evidence

of focal pulmonary consolidation. There is no evidence of failure. No pleural

effusions are visualized.[ 



IMPRESSION: No active disease in the chest.







Electronically signed by:  Keny Waggoner M.D.

8/20/2018 7:00 AM



Dictated Date/Time:  8/20/2018 7:00 AM

## 2018-08-20 NOTE — HISTORY AND PHYSICAL
History & Physical


Date & Time of Service:


Aug 20, 2018 at 11:26


Chief Complaint:


Abd pain, n/v


Primary Care Physician:


Carlos Patino M.D.


History of Present Illness


Source:  patient


56 y/o F c/o abd pain, n/v.  Pt states she woke around 3am on Sunday with 

sudden onset of abd pain.  This continued and she started having n/v as well.  

She has hx of pancreatitis, last being 3/2018 and states that this felt the 

same.  In the past, she states she waited about 4 days hoping her sx would 

improve and "by the time I got here there was a secondary thing happening", so 

she came to the ED yesterday.  Her PRP, lipase, and CTAP were are WNL, so she 

was d/c'd to home feeling improved s/p pain meds.  Her sx returned and so she 

came back to the ED this morning for further evaluation.  Pt states that 

Saturday was a normal day for her.  She ate and had no issues.  Her last 

tolerated meal was dinner Saturday night.  She has had emesis with all PO intake

, including K and contrast in the ED today. She had an episode of fecal 

incontinence that was loose in the ED today, but no prior diarrhea.  Her pain 

is epigastric and lower abd. Denies SOB or chest pain.  Pt states she does 

still have mild pain now, but "75% improved" s/p pain meds.  





Pt had a scope with Dr. Dixon in 2017 and was supposed to start "a medicine that 

starts with P", but never did.  She follows with Dr. Coyle [place] 

[facility].  She had a cath in 2014 that was WNL.  She takes aspirin 81mg daily 

"because my grandfather did and he lived to be 105". She has been told in the 

past to take meds for HTN and HLD, however "they didn't work after a month, so 

I was taken off of them".  She was dx with an adrenal tumor 2.5cm in 3/2018.  

She is being monitored for this. She states she did 24hr urine and saliva 

testing and they were WNL.





Past Medical/Surgical History


Pancreatitis


HTN


Hyperlipidemia


Adrenal tumor


Cath 2014, nonobstructive CAD





Family History





Family history was reviewed; no changes noted.


Grandfather: hrt problems, lived to 105





Social History


Smoking Status:  Never Smoker


Alcohol Use:  Hx of heavy alcohol use in high school and college, but no 

alcohol injestion since 11/2017 and prior to that it was 7/2017


Drug Use:  marijuana (once a month, last was over a month ago)


Marital Status:  





Allergies


Coded Allergies:  


     Hydroquinone (Verified  Allergy, Severe, BLISTER, 8/20/18)


 AFFECTS HER AT "20%"


     Hydrocortisone (Verified  Allergy, Unknown, BLISTERS, 8/20/18)


     Latex1 -Allergic Contact Dermititis (Verified  Allergy, Unknown, BLISTERS, 

8/20/18)


     Neomycin (Verified  Allergy, Unknown, BLISTERS, 8/20/18)


Uncoded Allergies:  


     AMMONIUM THIOGLYCOLATE (Allergy, Severe, BLISTERS, 5/14/18)


 COMPONENT OF LATEX





Home Medications


Scheduled


Aspirin (Aspirin 81), 81 MG PO DAILY


Cholecalciferol (Vitamin D3), 1 TAB PO DAILY





Review of Systems


Pertinent positives and negatives reviewed in HPI--all others negative





Physical Exam


Vital Signs











  Date Time  Temp Pulse Resp B/P (MAP) Pulse Ox O2 Delivery O2 Flow Rate FiO2


 


8/20/18 11:14  72 16 138/105 98 Nasal Cannula 2.0 


 


8/20/18 10:18  74 16 96/67 99 Nasal Cannula 2.0 


 


8/20/18 09:35  0 16 127/72 100 Room Air  


 


8/20/18 09:10  87 16 142/88 98 Room Air  


 


8/20/18 08:30  78 16 110/73 98 Room Air  


 


8/20/18 08:10     96 Nasal Cannula 2.0 


 


8/20/18 08:09  89 16 149/89 97 Room Air  


 


8/20/18 08:02  77 16 221/121    


 


8/20/18 06:48      Room Air  


 


8/20/18 06:48      Room Air  


 


8/20/18 06:45  79      


 


8/20/18 06:34 36.8 90 20 138/98 98 Room Air  








General Appearance:  WD/WN, no apparent distress


Head:  normocephalic, atraumatic


Eyes:  normal inspection, sclerae normal


Respiratory/Chest:  normal breath sounds, no respiratory distress


Cardiovascular:  regular rate, rhythm, no edema


Abdomen/GI:  non tender, soft


Extremities/Musculoskelatal:  no calf tenderness, no pedal edema


Neurologic/Psych:  alert, normal mood/affect, oriented x 3


Skin:  normal color, warm/dry





Diagnostics


Laboratory Results





Results Past 24 Hours








Test


  8/20/18


06:30 8/20/18


07:30 Range/Units


 


 


White Blood Count 20.60  4.8-10.8  K/uL


 


Red Blood Count 4.98  4.2-5.4  M/uL


 


Hemoglobin 14.9  12.0-16.0  g/dL


 


Hematocrit 41.5  37-47  %


 


Mean Corpuscular Volume 83.3    fL


 


Mean Corpuscular Hemoglobin 29.9  25-34  pg


 


Mean Corpuscular Hemoglobin


Concent 35.9


  


  32-36  g/dl


 


 


Platelet Count 362  130-400  K/uL


 


Mean Platelet Volume 10.3  7.4-10.4  fL


 


Neutrophils (%) (Auto) 84.7   %


 


Lymphocytes (%) (Auto) 8.3   %


 


Monocytes (%) (Auto) 6.5   %


 


Eosinophils (%) (Auto) 0.0   %


 


Basophils (%) (Auto) 0.1   %


 


Neutrophils # (Auto) 17.45  1.4-6.5  K/uL


 


Lymphocytes # (Auto) 1.71  1.2-3.4  K/uL


 


Monocytes # (Auto) 1.34  0.11-0.59  K/uL


 


Eosinophils # (Auto) 0.00  0-0.5  K/uL


 


Basophils # (Auto) 0.02  0-0.2  K/uL


 


RDW Standard Deviation 40.5  36.4-46.3  fL


 


RDW Coefficient of Variation 13.4  11.5-14.5  %


 


Immature Granulocyte % (Auto) 0.4   %


 


Immature Granulocyte # (Auto) 0.08  0.00-0.02  K/uL


 


Prothrombin Time


  10.3


  


  9.0-12.0


SECONDS


 


Prothromb Time International


Ratio 1.0


  


  0.9-1.1  


 


 


Activated Partial


Thromboplast Time 25.4


  


  21.0-31.0


SECONDS


 


Partial Thromboplastin Ratio 1.0   


 


Sodium Level 133  136-145  mmol/L


 


Potassium Level 2.8  3.5-5.1  mmol/L


 


Chloride Level 98    mmol/L


 


Carbon Dioxide Level 20  21-32  mmol/L


 


Anion Gap 15.0  3-11  mmol/L


 


Blood Urea Nitrogen 14  7-18  mg/dl


 


Creatinine


  1.13


  


  0.60-1.20


mg/dl


 


Est Creatinine Clear Calc


Drug Dose 48.0


  


   ml/min


 


 


Estimated GFR (


American) 61.6


  


   


 


 


Estimated GFR (Non-


American 53.2


  


   


 


 


BUN/Creatinine Ratio 12.4  10-20  


 


Random Glucose 165  70-99  mg/dl


 


Calcium Level 9.2  8.5-10.1  mg/dl


 


Magnesium Level 1.6  1.8-2.4  mg/dl


 


Total Bilirubin 0.8  0.2-1  mg/dl


 


Direct Bilirubin 0.2  0-0.2  mg/dl


 


Aspartate Amino Transf


(AST/SGOT) 22


  


  15-37  U/L


 


 


Alanine Aminotransferase


(ALT/SGPT) 21


  


  12-78  U/L


 


 


Alkaline Phosphatase 85    U/L


 


Total Creatine Kinase 133    U/L


 


Creatine Kinase MB 5.4  0.5-3.6  ng/ml


 


Creatine Kinase MB Ratio 4.1  0-3.0  


 


Troponin I 1.010  0-0.045  ng/ml


 


Total Protein 8.4  6.4-8.2  gm/dl


 


Albumin 4.4  3.4-5.0  gm/dl


 


Lipase 2023    U/L


 


Ethyl Alcohol mg/dL  < 3.0 0-3  mg/dl











Diagnostic Radiology


CT AP: neg for pancreatitis


CTAP noted for possible colitis yesterday





CT chest: neg for acute





CXR neg for acute


Normal EKG





Impression


Assessment and Plan


56 y/o F who was admitted on 8/20 with acute pancreatitis and elevated trop





Acute pancreatitis: not noted on CTAP, however clinical picture and elevated 

lipase, WBC with electrolyte abn support this dx with hx of same


   Monitor on IVF, NPO


   Uncertain etiology, triglycerides pending


   Has seen Dr. Dixon in the past if GI needed





Elevated trop: seen by Dr. Edouard in the ED


   ECHO done, results pending


   Feels demand ischemia and will monitor for now


   Serial trops pending


   Tele monitor


   Started on heparin drip in the ED, if trops improved, will d/c


   Holding aspirin 81mg for heparin drip


   Follows with Dr. Coyle in [place] if records needed





HypoK, HypoNa, HypoMg: in the setting of acute pancreatitis


   Replace and monitor





Elevated BP: hx of HTN, declined outpt meds


   Monitor


   Recs for beta blocker vs nitrates if ongoing issue, however WNL for the last 

several 





Hyperlipidemia: lipids pending





Adrenal tumor: pt states this is being monitored, 21mm L sided noted on CT AP 2/ 2018


   Not noted on CT AP today, but noted on CT yesterday





Other: Full code, states she does not want prolonged life support, feeding 

tubes etc


   Heparin drip for DVT proph


   NPO with IVF





Resuscitation Status








VTE Prophylaxis


Will order VTE Prophylaxis:  Yes





Additional Copies To


Carlos Patino M.D.

## 2018-08-20 NOTE — CARDIOLOGY CONSULTATION
Cardiology Consultation


Date of Consultation:


Aug 20, 2018.


Requesting Physician:


Dr. Chacko


Reason for Consultation:


Elevated Troponin


Pt evaluation today including:  conversation w/ patient, physical exam, lab 

review, review of studies, review of inpatient medication list, conversation w/ 

attending


History of Present Illness


Patient seen and examined in the emergency room. She just received IV Ativan 

for her echocardiogram and cannot provide a coherent history at the moment. 


This is a 57-year-old woman who has a history of known nonocclusive coronary 

artery disease followed by Dr. Coyle in [place].  She had catheterization 

in .  She has refused statin therapy.  She also has a history of 

hypertension and has refused blood pressure medication.





Past Medical/Surgical History





(1) Hypertension





Family History





FH: arthritis


FH: stroke





Social History


Smoking Status:  Unknown if Ever Smoked


History of Alcohol Use:  Yes





Review of Systems


Constitutional:  No fever, No weight loss, No weakness


Respiratory:  No cough, No wheezing, No shortness of breath, No dyspnea on 

exertion


Cardiac:  No chest pain, No orthopnea, No PND, No edema, No palpitations


Abdomen:  + see HPI, + pain, + vomiting, No nausea, No diarrhea, No GI bleeding


Female :  No problem reported


Neurologic:  No paralysis, No weakness, No numbness/tingling, No balance 

problems


Heme:  No abnormal bleeding/bruising, No clotting problems


Endo:  No fatigue


Skin:  No problem reported


Probably not reliable due to just receiving Ativan and lack of concentration


All Other Systems:  Reviewed and Negative





Allergies


Coded Allergies:  


     Hydroquinone (Verified  Allergy, Severe, BLISTER, 18)


 AFFECTS HER AT "20%"


     Hydrocortisone (Verified  Allergy, Unknown, BLISTERS, 18)


     Latex1 -Allergic Contact Dermititis (Verified  Allergy, Unknown, BLISTERS, 

18)


     Neomycin (Verified  Allergy, Unknown, BLISTERS, 18)


Uncoded Allergies:  


     AMMONIUM THIOGLYCOLATE (Allergy, Severe, BLISTERS, 18)


 COMPONENT OF LATEX





Medications





Current Inpatient Medications








 Medications


  (Trade)  Dose


 Ordered  Sig/Maryam


 Route  Start Time


 Stop Time Status Last Admin


Dose Admin


 


 Nitroglycerin


  (Nitrolingual


 Spray)  3 sprays  NOW  PRN


 SL  18 07:30


 18 07:29   


 


 


 Ioversol


  (Optiray 320)  100 ml  UD  PRN


 IV  18 07:30


 18 07:29   


 


 


 Sodium Chloride  1,000 ml @ 


 999 mls/hr  Q1H1M STAT


 IV  18 07:25


 18 08:25  18 07:25


999 MLS/HR











Physical Exam





Vital Signs Past 12 Hours








  Date Time  Temp Pulse Resp B/P (MAP) Pulse Ox O2 Delivery O2 Flow Rate FiO2


 


18 08:09  89 16 149/89 97 Room Air  


 


18 08:02  77 16 221/121    


 


18 06:48      Room Air  


 


18 06:48      Room Air  


 


18 06:45  79      


 


18 06:34 36.8 90 20 138/98 98 Room Air  








Constitutional:  


   Level of Distress:  moderate distress


Psychiatric:  


   Mental Status:  active & alert


Head:  normocephalic


Eyes:  


   EOM:  EOMI


ENMT:  normal ENT inspection, hearing grossly normal


Neck:  supple, no masses


Lungs:  


   Respiratory effort:  no dyspnea, good air movement


   Auscultation:  breath sounds normal, no wheezing


Cardiovascular:  


   Heart Auscultation:  RRR, no murmurs, no rubs, no gallops


Peripheral Pulses:  


   Bruits:  none appreciated


Abdomen:  


   Bowel Sounds:  normal


   Inspection & Palpation:  soft, no tenderness, guarding & rebound, no masses


Musculoskeletal:  normal strength (5/5 throughout)


Extremities:  no edema


Neurologic:  


   Cranial Nerves:  grossly intact


   Sensation:  grossly intact





Data


Laboratory Results:





Last 24 Hours








Test


  18


06:30 18


07:30


 


White Blood Count 20.60 K/uL  


 


Red Blood Count 4.98 M/uL  


 


Hemoglobin 14.9 g/dL  


 


Hematocrit 41.5 %  


 


Mean Corpuscular Volume 83.3 fL  


 


Mean Corpuscular Hemoglobin 29.9 pg  


 


Mean Corpuscular Hemoglobin


Concent 35.9 g/dl 


  


 


 


Platelet Count 362 K/uL  


 


Mean Platelet Volume 10.3 fL  


 


Neutrophils (%) (Auto) 84.7 %  


 


Lymphocytes (%) (Auto) 8.3 %  


 


Monocytes (%) (Auto) 6.5 %  


 


Eosinophils (%) (Auto) 0.0 %  


 


Basophils (%) (Auto) 0.1 %  


 


Neutrophils # (Auto) 17.45 K/uL  


 


Lymphocytes # (Auto) 1.71 K/uL  


 


Monocytes # (Auto) 1.34 K/uL  


 


Eosinophils # (Auto) 0.00 K/uL  


 


Basophils # (Auto) 0.02 K/uL  


 


RDW Standard Deviation 40.5 fL  


 


RDW Coefficient of Variation 13.4 %  


 


Immature Granulocyte % (Auto) 0.4 %  


 


Immature Granulocyte # (Auto) 0.08 K/uL  


 


Prothrombin Time 10.3 SECONDS  


 


Prothromb Time International


Ratio 1.0 


  


 


 


Activated Partial


Thromboplast Time 25.4 SECONDS 


  


 


 


Partial Thromboplastin Ratio 1.0  


 


Sodium Level 133 mmol/L  


 


Potassium Level 2.8 mmol/L  


 


Chloride Level 98 mmol/L  


 


Carbon Dioxide Level 20 mmol/L  


 


Anion Gap 15.0 mmol/L  


 


Blood Urea Nitrogen 14 mg/dl  


 


Creatinine 1.13 mg/dl  


 


Est Creatinine Clear Calc


Drug Dose 48.0 ml/min 


  


 


 


Estimated GFR (


American) 61.6 


  


 


 


Estimated GFR (Non-


American 53.2 


  


 


 


BUN/Creatinine Ratio 12.4  


 


Random Glucose 165 mg/dl  


 


Calcium Level 9.2 mg/dl  


 


Total Bilirubin 0.8 mg/dl  


 


Direct Bilirubin 0.2 mg/dl  


 


Aspartate Amino Transf


(AST/SGOT) 22 U/L 


  


 


 


Alanine Aminotransferase


(ALT/SGPT) 21 U/L 


  


 


 


Alkaline Phosphatase 85 U/L  


 


Total Creatine Kinase 133 U/L  


 


Creatine Kinase MB 5.4 ng/ml  


 


Creatine Kinase MB Ratio 4.1  


 


Troponin I 1.010 ng/ml  


 


Total Protein 8.4 gm/dl  


 


Albumin 4.4 gm/dl  


 


Lipase 2023 U/L  


 


Ethyl Alcohol mg/dL  < 3.0 mg/dl 








Imaging: Chest x-ray shows no active disease





EK electrocardiograms are available this presentation, both show sinus 

rhythm with QT prolongation and possible hyperacute T waves.





Telemetry reviewed: Sinus rhythm, no significant ectopy





Echocardiogram: Reviewed at the bedside in the emergency room including with 

Definity: There does not appear to be any significant abnormality and overall 

function and no significant wall motion abnormality.





Assessment & Plan


1.  Elevated troponin: She has significant troponin elevation, I suspect this 

is demand ischemia, she has known underlying coronary artery disease based on 

catheterization 4 years ago and has refused statin therapy as well as treatment 

for her hypertension.  I would not take her urgently to the laboratory, I would 

recommend to monitor serial cardiac enzymes and electrocardiograms.





2.  Abdominal discomfort: Based on her elevated lipase and elevated white count 

I suspect this is acute pancreatitis.  I doubt it is referred pain from a 

cardiac etiology.





3.  Severe hypertension: This is likely stress related in part although she has 

a history of very high blood pressure and has refused treatment.  I would 

recommend treating this with beta-blockade and nitrates.





Thank you for allowing me to participate in her care.

## 2018-08-20 NOTE — EMERGENCY ROOM VISIT NOTE
History


First contact with patient:  06:31


Chief Complaint:  ABDOMINAL PAIN


Stated Complaint:  Abd pain, n/v


Nursing Triage Summary:  


Pt was here yesterday and returns tonight via ambulance for same sx. Pt states 


she woke up at 0330 a with n/v and abd pain. Pt has h/o prancreatitis. Pt 

states 


"I want those pain meds I had yesterday they made me feel good."





History of Present Illness


The patient is a 57 year old female who presents to the Emergency Room with 

complaints of epigastric pain nausea and vomiting.  The patient's family called 

an ambulance this morning for an unresponsive female who was felt to be 

intoxicated.  The patient arrives to the emergency department asking for the 

pain medication that she received yesterday.  She reports she has a history of 

pancreatitis.  The patient reports that she takes a daily aspirin as her only 

medication and also reports that she has been increasing her aspirin intake for 

this epigastric pain.  The patient is rambling and notes that she has an 

adrenal tumor that was felt to be causing the pain at one point.  She also 

reports being scoped by  that starts with the G earlier in 2017.  The 

patient is writhing around on the bed moaning.  She reports she was prescribed 

a medication that starts with a "P" for her stomach however she never filled 

this medication.  The patient reports calling fabio [facility] for an appointment 

at 5 this morning however nobody called her back.





Review of Systems


See HPI for pertinent positives & negatives. A total of 10 systems reviewed and 

were otherwise negative.





Past Medical/Surgical History


Medical Problems:


(1) Acute pancreatitis


(2) Gastroenteritis


(3) Hypertension








Family History





FH: arthritis


FH: stroke





Social History


Smoking Status:  Unknown if Ever Smoked


Drug Use:  none


Marital Status:  


Housing Status:  lives with significant other





Current/Historical Medications


Scheduled


Aspirin (Aspirin 81), 81 MG PO DAILY


Cholecalciferol (Vitamin D3), 1 TAB PO DAILY





Physical Exam


Vital Signs











  Date Time  Temp Pulse Resp B/P (MAP) Pulse Ox O2 Delivery O2 Flow Rate FiO2


 


8/20/18 11:14  72 16 138/105 98 Nasal Cannula 2.0 


 


8/20/18 10:18  74 16 96/67 99 Nasal Cannula 2.0 


 


8/20/18 09:35  0 16 127/72 100 Room Air  


 


8/20/18 09:10  87 16 142/88 98 Room Air  


 


8/20/18 08:30  78 16 110/73 98 Room Air  


 


8/20/18 08:10     96 Nasal Cannula 2.0 


 


8/20/18 08:09  89 16 149/89 97 Room Air  


 


8/20/18 08:02  77 16 221/121    


 


8/20/18 06:48      Room Air  


 


8/20/18 06:48      Room Air  


 


8/20/18 06:45  79      


 


8/20/18 06:34 36.8 90 20 138/98 98 Room Air  











Physical Exam


GENERAL: Awake, alert, well-appearing, in no acute distress when talking but 

moaning loudly throught ED when nobody is in the room


HENT: Normocephalic, atraumatic. Oropharynx unremarkable.


EYES: Normal conjunctiva. Sclera non-icteric.


NECK: Supple. No nuchal rigidity. FROM. No JVD.


RESPIRATORY: Clear to auscultation.


CARDIAC: Regular rate, normal rhythm. Extremities warm and well perfused. 

Pulses equal.


ABDOMEN: Soft, non-distended. No tenderness to palpation. No rebound or 

guarding. No masses.


RECTAL: Deferred.


MUSCULOSKELETAL: Chest examination reveals no tenderness. The back is 

symmetrical on inspection without obvious abnormality. There is no CVA 

tenderness to palpation. No joint edema. 


LOWER EXTREMITIES: Calves are equal size bilaterally and non-tender. No edema. 

No discoloration. 


NEURO: Normal sensorium. No sensory or motor deficits noted. 


SKIN: No rash or jaundice noted.





Medical Decision & Procedures


ER Provider


Diagnostic Interpretation:


CHEST ONE VIEW PORTABLE





CLINICAL HISTORY: Atypical chest pain, nausea, vomiting.    





COMPARISON STUDY:  August 19, 2018





FINDINGS: The cardiac and mediastinal contours are normal. There is no evidence


of focal pulmonary consolidation. There is no evidence of failure. No pleural


effusions are visualized.[ 





IMPRESSION: No active disease in the chest.


CT ABD/PELVIS IV CONTRAST ONLY





CLINICAL HISTORY: Epigastric pain, nausea, vomiting.    





COMPARISON STUDY:  August 19, 2018





TECHNIQUE: Following the IV administration of 119 mL of Optiray-320, CT scan of


the abdomen and pelvis was performed from the lung bases to the proximal femurs.


Images are reviewed in the axial, sagittal, and coronal planes. IV contrast was


administered without complication.  A dose lowering technique was utilized


adhering to the principles of ALARA.








CT DOSE: 705.17 mGy.cm





FINDINGS:





There is mild motion artifact.





Lower chest: The heart is normal in size and configuration, without pericardial


effusion. The lung bases and pleural spaces are clear.





Liver: The contrast-enhanced liver is normal in size, contour, and attenuation.


There is no intrahepatic biliary ductal dilatation. The hepatic veins and portal


veins are patent.





Gallbladder: Unremarkable.





Spleen: Normal in size and attenuation.





Pancreas: Unremarkable.





Adrenal glands: Unremarkable.





Kidneys: There is minor fullness of each renal collecting system. Significant


hydronephrosis is not felt to be present. No solid renal masses are visualized.





Bowel: There are no transition zones indicate bowel obstruction. The appendix is


not well-visualized. There are however no secondary findings to indicate acute


appendicitis. There are no findings to indicate acute diverticulitis. There is a


rectosigmoid air-fluid level.





Peritoneum: There is no intraperitoneal free air or abdominal ascites.





Vasculature: The abdominal aorta is normal in course and caliber.





Adenopathy: None.





Pelvic viscera: There is stable borderline bladder wall thickening.





Skeletal structures: No destructive osseous lesions are seen.





IMPRESSION:  


1. No acute intra-abdominal or pelvic findings


2. Mild fullness of both renal collecting systems, unchanged from the prior


study


3. No evidence of bowel obstruction. No evidence of free air


4. No evidence of acute appendicitis. No evidence of acute diverticulitis.


5. Stable borderline bladder wall thickening











Electronically signed by:  Keny Waggoner M.D.


8/20/2018 9:25 AM





Dictated Date/Time:  8/20/2018 9:18 AM


(CHEST FOR PE) ANGIO WITH





HISTORY:  57 years-old Female with    .  Presents with acute epigastric


abdominal pain and acute chest pain with shortness of breath, nausea and


vomiting  





TECHNIQUE: Multiple CTA images of the chest were obtained after the intravenous


administration of 119 ml Optiray 320.  Coronal and sagittal MIPS were obtained


from the axial data set and were submitted for review.  A dose lowering


technique was utilized adhering to the principles of ALARA.





COMPARISON: CT abdomen and pelvis of same day, chest radiograph of same day





FINDINGS: 





CTA:  


Heart is normal in size without pericardial effusion. Coronary arterial


calcifications are noted. The thoracic aorta is normal in both course and


caliber without aneurysm or dissection. Calcification is noted at the origin of


the celiac trunk. The pulmonary arterial tree is opacified to level of the


proximal segmental branches. The segmental subsegmental branches are not well


dilated secondary to respiratory motion. No filling defects identified to


suggest pulmonary thromboembolic disease.


 


CT CHEST:  


No focal thyroid nodule identified. No pathologically enlarged lymph nodes are


identified. There is no pneumothorax or pleural effusion. Mild dependent


subsegmental groundglass densities suggest atelectasis. Evaluation of the lung


parenchyma is also mildly limited secondary to respiratory motion. The central


airways are patent.





There is mild circumferential wall thickening of the mid to distal esophagus


with small sliding-type hiatal hernia. Oral contrast is noted within the hernia


sac. There is moderate thickening about the bilateral adrenal glands, only


partially imaged. Please refer to CT abdomen and pelvis of same day for further


details. Calcifications are noted about the left breast. Multilevel spondylitic


spurring with intervertebral disc space narrowing about the spine.








IMPRESSION:  


1. No acute intrathoracic abnormality identified, specifically no acute aortic


pathology or evidence of pulmonary thromboembolic disease. Please note however


that the exam is limited secondary to respiratory motion. No central pulmonary


thromboembolic disease.


2. No focal airspace consolidation to suggest pneumonia.


3. Mild coronary arterial disease.


4. Mild wall thickening about the mid and distal esophagus with small


sliding-type hiatal hernia.





The above report was generated using voice recognition software. It may contain


grammatical, syntax or spelling errors.











Electronically signed by:  Juan David Darnell M.D.


8/20/2018 9:22 AM





Dictated Date/Time:  8/20/2018 9:12 AM





Laboratory Results


8/20/18 06:30








Red Blood Count 4.98, Mean Corpuscular Volume 83.3, Mean Corpuscular Hemoglobin 

29.9, Mean Corpuscular Hemoglobin Concent 35.9, Mean Platelet Volume 10.3, 

Neutrophils (%) (Auto) 84.7, Lymphocytes (%) (Auto) 8.3, Monocytes (%) (Auto) 

6.5, Eosinophils (%) (Auto) 0.0, Basophils (%) (Auto) 0.1, Neutrophils # (Auto) 

17.45, Lymphocytes # (Auto) 1.71, Monocytes # (Auto) 1.34, Eosinophils # (Auto) 

0.00, Basophils # (Auto) 0.02





8/20/18 06:30

















Test


  8/20/18


06:30 8/20/18


07:30


 


White Blood Count


  20.60 K/uL


(4.8-10.8) 


 


 


Red Blood Count


  4.98 M/uL


(4.2-5.4) 


 


 


Hemoglobin


  14.9 g/dL


(12.0-16.0) 


 


 


Hematocrit 41.5 % (37-47)  


 


Mean Corpuscular Volume


  83.3 fL


() 


 


 


Mean Corpuscular Hemoglobin


  29.9 pg


(25-34) 


 


 


Mean Corpuscular Hemoglobin


Concent 35.9 g/dl


(32-36) 


 


 


Platelet Count


  362 K/uL


(130-400) 


 


 


Mean Platelet Volume


  10.3 fL


(7.4-10.4) 


 


 


Neutrophils (%) (Auto) 84.7 %  


 


Lymphocytes (%) (Auto) 8.3 %  


 


Monocytes (%) (Auto) 6.5 %  


 


Eosinophils (%) (Auto) 0.0 %  


 


Basophils (%) (Auto) 0.1 %  


 


Neutrophils # (Auto)


  17.45 K/uL


(1.4-6.5) 


 


 


Lymphocytes # (Auto)


  1.71 K/uL


(1.2-3.4) 


 


 


Monocytes # (Auto)


  1.34 K/uL


(0.11-0.59) 


 


 


Eosinophils # (Auto)


  0.00 K/uL


(0-0.5) 


 


 


Basophils # (Auto)


  0.02 K/uL


(0-0.2) 


 


 


RDW Standard Deviation


  40.5 fL


(36.4-46.3) 


 


 


RDW Coefficient of Variation


  13.4 %


(11.5-14.5) 


 


 


Immature Granulocyte % (Auto) 0.4 %  


 


Immature Granulocyte # (Auto)


  0.08 K/uL


(0.00-0.02) 


 


 


Prothrombin Time


  10.3 SECONDS


(9.0-12.0) 


 


 


Prothromb Time International


Ratio 1.0 (0.9-1.1) 


  


 


 


Anion Gap


  15.0 mmol/L


(3-11) 


 


 


Est Creatinine Clear Calc


Drug Dose 48.0 ml/min 


  


 


 


Estimated GFR (


American) 61.6 


  


 


 


Estimated GFR (Non-


American 53.2 


  


 


 


BUN/Creatinine Ratio 12.4 (10-20)  


 


Calcium Level


  9.2 mg/dl


(8.5-10.1) 


 


 


Magnesium Level


  1.6 mg/dl


(1.8-2.4) 


 


 


Total Bilirubin


  0.8 mg/dl


(0.2-1) 


 


 


Direct Bilirubin


  0.2 mg/dl


(0-0.2) 


 


 


Aspartate Amino Transf


(AST/SGOT) 22 U/L (15-37) 


  


 


 


Alanine Aminotransferase


(ALT/SGPT) 21 U/L (12-78) 


  


 


 


Alkaline Phosphatase


  85 U/L


() 


 


 


Total Creatine Kinase


  133 U/L


() 


 


 


Creatine Kinase MB


  5.4 ng/ml


(0.5-3.6) 


 


 


Creatine Kinase MB Ratio 4.1 (0-3.0)  


 


Troponin I


  1.010 ng/ml


(0-0.045) 


 


 


Total Protein


  8.4 gm/dl


(6.4-8.2) 


 


 


Albumin


  4.4 gm/dl


(3.4-5.0) 


 


 


Lipase


  2023 U/L


() 


 


 


Ethyl Alcohol mg/dL


  


  < 3.0 mg/dl


(0-3)











Medications Administered











 Medications


  (Trade)  Dose


 Ordered  Sig/Maryam


 Route  Start Time


 Stop Time Status Last Admin


Dose Admin


 


 Miscellaneous


 Medication


  (Gi Cocktail)  24 ml  NOW  STAT


 PO  8/20/18 06:43


 8/20/18 06:45 DC 8/20/18 06:43


24 ML


 


 Pantoprazole


 Sodium 40 mg/


 Syringe  10 ml @ 5


 mls/min  NOW  ONCE


 IV  8/20/18 07:00


 8/20/18 07:01 DC 8/20/18 07:27


5 MLS/MIN


 


 Ondansetron HCl


  (Zofran Inj)  4 mg  NOW  STAT


 IV  8/20/18 06:48


 8/20/18 06:49 DC 8/20/18 06:59


4 MG


 


 Potassium Chloride


  (Verena Ciel Elix)  40 meq  NOW  STAT


 PO  8/20/18 07:04


 8/20/18 07:06 DC 8/20/18 07:24


40 MEQ


 


 Potassium Chloride  100 ml @ 


 100 mls/hr  NOW  STAT


 IV  8/20/18 07:12


 8/20/18 08:11 DC 8/20/18 07:23


100 MLS/HR


 


 Heparin Sodium/


 Dextrose


  (Heparin 25,000


 Unit/500ml D5W)  25,000 unit  STK-MED ONCE


 .ROUTE  8/20/18 07:22


 8/20/18 07:23 DC 8/20/18 07:29


25,000 UNIT


 


 Nitroglycerin


  (Nitroglycerin


 Spray)  60 spry  STK-MED ONCE


 .ROUTE  8/20/18 07:22


 8/20/18 07:23 DC 8/20/18 07:30


60 SPRY


 


 Sodium Chloride  1,000 ml @ 


 999 mls/hr  Q1H1M STAT


 IV  8/20/18 07:25


 8/20/18 08:25 DC 8/20/18 07:25


999 MLS/HR


 


 Nitroglycerin


  (Nitroglycerin


 2% Oint)  1 inch  NOW  STAT


 EXT  8/20/18 07:41


 8/20/18 07:42 DC 8/20/18 09:26


1 INCH


 


 Lorazepam


  (Ativan Inj)  1 mg  NOW  STAT


 IV  8/20/18 07:49


 8/20/18 07:51 DC 8/20/18 07:59


1 MG


 


 Potassium Chloride  100 ml @ 


 100 mls/hr  NOW  STAT


 IV  8/20/18 08:28


 8/20/18 09:27 DC 8/20/18 09:27


100 MLS/HR











ECG Per My Interpretation


Indication:  abdominal pain


Rate (beats per minute):  69


Rhythm:  normal sinus


Findings:  no acute ischemic change, prolonged QT, no ectopy


Change:


 repeat EKG @ 0743 NSR rate of 8 prolonged QT, no ST elevation or depression; 

unchanged from previous





Medical Decision


 This is a 57-year-old female who arrives via ambulance moaning and writhing 

around the bed.  The patient was just in the emergency department yesterday.  

Previous records were reviewed by myself in the room with the patient.  The 

patient notes she should not of been sent home yesterday with her brought blood 

pressure as high as it was.  I then noted that her blood pressure came down 

when her pain was under control.  I strongly suspect based on the patient's 

symptoms as well as her past medical history and reviewing her CAT scan 

yesterday along with her intake of aspirin that she may be suffering from 

reflux along with ulcer disease.  She was found to have an elevation in her 

troponin.  She was given a GI cocktail along with Pepcid and Carafate.  Her 

potassium was found to be low and I begin repeating this via IV as the patient 

vomited up p.o. potassium.  Based on the patient's EKG I did discuss the case 

with the on-call cardiologist who agreed to see the patient.  I also discussed 

the case with the hospitalist.  I did place the patient on a heparin drip.  She 

does have an elevation in lipase however no evidence of pancreatitis on CAT 

scan.  The patient appears manic in her presentation and to calm her down she 

was given 1 mg of Ativan.





Impression





 Primary Impression:  


 NSTEMI (non-ST elevated myocardial infarction)


 Additional Impression:  


 Pancreatitis


I have personally spent greater than 30 minutes of critical care time in the 

direct management of this patient.  This includes bedside care, interpretation 

of diagnostic studies, and testing, discussion with consultants, patient, and 

family members, and other required patient management activities.  This 30 

minutes is in excess of all separately billable procedures.





Departure Information


Dispostion


Home / Self-Care





Referrals


Carlos Patino M.D. (PCP)





Patient Instructions


[facility]





Problem Qualifiers








 Additional Impression:  


 Pancreatitis


 Chronicity:  acute  Pancreatitis type:  unspecified pancreatitis type  Acute 

pancreatitis complication:  unspecified  Qualified Codes:  K85.90 - Acute 

pancreatitis without necrosis or infection, unspecified

## 2018-08-20 NOTE — DIAGNOSTIC IMAGING REPORT
CT ABD/PELVIS IV CONTRAST ONLY



CLINICAL HISTORY: Epigastric pain, nausea, vomiting.    



COMPARISON STUDY:  August 19, 2018



TECHNIQUE: Following the IV administration of 119 mL of Optiray-320, CT scan of

the abdomen and pelvis was performed from the lung bases to the proximal femurs.

Images are reviewed in the axial, sagittal, and coronal planes. IV contrast was

administered without complication.  A dose lowering technique was utilized

adhering to the principles of ALARA.





CT DOSE: 705.17 mGy.cm



FINDINGS:



There is mild motion artifact.



Lower chest: The heart is normal in size and configuration, without pericardial

effusion. The lung bases and pleural spaces are clear.



Liver: The contrast-enhanced liver is normal in size, contour, and attenuation.

There is no intrahepatic biliary ductal dilatation. The hepatic veins and portal

veins are patent.



Gallbladder: Unremarkable.



Spleen: Normal in size and attenuation.



Pancreas: Unremarkable.



Adrenal glands: Unremarkable.



Kidneys: There is minor fullness of each renal collecting system. Significant

hydronephrosis is not felt to be present. No solid renal masses are visualized.



Bowel: There are no transition zones indicate bowel obstruction. The appendix is

not well-visualized. There are however no secondary findings to indicate acute

appendicitis. There are no findings to indicate acute diverticulitis. There is a

rectosigmoid air-fluid level.



Peritoneum: There is no intraperitoneal free air or abdominal ascites.



Vasculature: The abdominal aorta is normal in course and caliber.



Adenopathy: None.



Pelvic viscera: There is stable borderline bladder wall thickening.



Skeletal structures: No destructive osseous lesions are seen.



IMPRESSION:  

1. No acute intra-abdominal or pelvic findings

2. Mild fullness of both renal collecting systems, unchanged from the prior

study

3. No evidence of bowel obstruction. No evidence of free air

4. No evidence of acute appendicitis. No evidence of acute diverticulitis.

5. Stable borderline bladder wall thickening







Electronically signed by:  Keny Waggoner M.D.

8/20/2018 9:25 AM



Dictated Date/Time:  8/20/2018 9:18 AM

## 2018-08-21 VITALS — OXYGEN SATURATION: 97 %

## 2018-08-21 VITALS
TEMPERATURE: 97.52 F | DIASTOLIC BLOOD PRESSURE: 79 MMHG | HEART RATE: 57 BPM | SYSTOLIC BLOOD PRESSURE: 131 MMHG | OXYGEN SATURATION: 97 %

## 2018-08-21 VITALS
OXYGEN SATURATION: 98 % | DIASTOLIC BLOOD PRESSURE: 65 MMHG | HEART RATE: 69 BPM | SYSTOLIC BLOOD PRESSURE: 147 MMHG | TEMPERATURE: 98.06 F

## 2018-08-21 VITALS
DIASTOLIC BLOOD PRESSURE: 86 MMHG | TEMPERATURE: 98.24 F | SYSTOLIC BLOOD PRESSURE: 145 MMHG | OXYGEN SATURATION: 99 % | HEART RATE: 66 BPM

## 2018-08-21 VITALS
DIASTOLIC BLOOD PRESSURE: 82 MMHG | HEART RATE: 65 BPM | OXYGEN SATURATION: 95 % | TEMPERATURE: 98.06 F | SYSTOLIC BLOOD PRESSURE: 145 MMHG

## 2018-08-21 VITALS
DIASTOLIC BLOOD PRESSURE: 73 MMHG | SYSTOLIC BLOOD PRESSURE: 120 MMHG | OXYGEN SATURATION: 99 % | HEART RATE: 57 BPM | TEMPERATURE: 97.7 F

## 2018-08-21 LAB
BASOPHILS # BLD: 0.03 K/UL (ref 0–0.2)
BASOPHILS NFR BLD: 0.3 %
BUN SERPL-MCNC: 8 MG/DL (ref 7–18)
CALCIUM SERPL-MCNC: 7.7 MG/DL (ref 8.5–10.1)
CO2 SERPL-SCNC: 24 MMOL/L (ref 21–32)
CREAT SERPL-MCNC: 0.56 MG/DL (ref 0.6–1.2)
EOS ABS #: 0.03 K/UL (ref 0–0.5)
EOSINOPHIL NFR BLD AUTO: 263 K/UL (ref 130–400)
GLUCOSE SERPL-MCNC: 94 MG/DL (ref 70–99)
HCT VFR BLD CALC: 34.4 % (ref 37–47)
HGB BLD-MCNC: 11.6 G/DL (ref 12–16)
IG#: 0.02 K/UL (ref 0–0.02)
IMM GRANULOCYTES NFR BLD AUTO: 27.5 %
LIPASE: 174 U/L (ref 73–393)
LYMPHOCYTES # BLD: 2.47 K/UL (ref 1.2–3.4)
MCH RBC QN AUTO: 29.4 PG (ref 25–34)
MCHC RBC AUTO-ENTMCNC: 33.7 G/DL (ref 32–36)
MCV RBC AUTO: 87.1 FL (ref 80–100)
MONO ABS #: 0.71 K/UL (ref 0.11–0.59)
MONOCYTES NFR BLD: 7.9 %
NEUT ABS #: 5.73 K/UL (ref 1.4–6.5)
NEUTROPHILS # BLD AUTO: 0.3 %
NEUTROPHILS NFR BLD AUTO: 63.8 %
PMV BLD AUTO: 9.9 FL (ref 7.4–10.4)
POTASSIUM SERPL-SCNC: 3.7 MMOL/L (ref 3.5–5.1)
PTT PATIENT: 42.3 SECONDS (ref 21–31)
PTT PATIENT: 49.4 SECONDS (ref 21–31)
RED CELL DISTRIBUTION WIDTH CV: 13.6 % (ref 11.5–14.5)
RED CELL DISTRIBUTION WIDTH SD: 43.6 FL (ref 36.4–46.3)
SODIUM SERPL-SCNC: 142 MMOL/L (ref 136–145)
WBC # BLD AUTO: 8.99 K/UL (ref 4.8–10.8)

## 2018-08-21 RX ADMIN — PANTOPRAZOLE SODIUM SCH MLS/MIN: 40 INJECTION, POWDER, FOR SOLUTION INTRAVENOUS at 20:27

## 2018-08-21 RX ADMIN — HEPARIN SODIUM SCH MLS/HR: 5000 INJECTION, SOLUTION INTRAVENOUS at 06:22

## 2018-08-21 RX ADMIN — SODIUM CHLORIDE AND POTASSIUM CHLORIDE SCH MLS/HR: 9; 1.49 INJECTION, SOLUTION INTRAVENOUS at 20:19

## 2018-08-21 RX ADMIN — SODIUM CHLORIDE AND POTASSIUM CHLORIDE SCH MLS/HR: 9; 1.49 INJECTION, SOLUTION INTRAVENOUS at 12:50

## 2018-08-21 RX ADMIN — SUCRALFATE SCH GM: 1 SUSPENSION ORAL at 20:27

## 2018-08-21 RX ADMIN — SUCRALFATE SCH GM: 1 SUSPENSION ORAL at 15:21

## 2018-08-21 RX ADMIN — SODIUM CHLORIDE AND POTASSIUM CHLORIDE SCH MLS/HR: 9; 1.49 INJECTION, SOLUTION INTRAVENOUS at 03:55

## 2018-08-21 RX ADMIN — PANTOPRAZOLE SODIUM SCH MLS/MIN: 40 INJECTION, POWDER, FOR SOLUTION INTRAVENOUS at 07:59

## 2018-08-21 RX ADMIN — Medication SCH MG: at 20:27

## 2018-08-21 NOTE — PROGRESS NOTE
Subjective


Date of Service:


Aug 21, 2018.


Subjective


Pt evaluation today including:  conversation w/ patient, physical exam, chart 

review, lab review, review of studies (CT chest/abd/pelvis; outpatient heart 

cath - 2014 (found in [employer]) ), review of inpatient medication list


Pain:  mild high epigastric discomfort; no chest symptoms


PO Intake:  npo


Voiding:  no voiding problems


tele stable overnight





no nausea, emesis, or diarrhea 





no chest pain, dyspnea, orthopnea





feels much better today 





asks numerous questions about the pancreatitis





Problem List


Medical Problems:


(1) Abdominal pain


Status: Acute  





(2) Diarrhea


Status: Acute  





(3) GI bleed


Status: Acute  





(4) Influenza


Status: Acute  





(5) Nausea


Status: Acute  





(6) Pancreatitis


Status: Acute  





(7) Vomiting


Status: Acute  











Review of Systems


Constitutional:  No fever, No chills


Respiratory:  No shortness of breath, No dyspnea on exertion


Cardiac:  No chest pain, No orthopnea, No PND, No edema


Abdomen:  + pain, No nausea, No vomiting, No diarrhea, No GI bleeding





Objective


Vital Signs











  Date Time  Temp Pulse Resp B/P (MAP) Pulse Ox O2 Delivery O2 Flow Rate FiO2


 


8/21/18 18:58 36.8 66 18 145/86 (105) 99 Room Air  


 


8/21/18 16:00      Room Air  


 


8/21/18 15:25 36.7 69 18 147/65 (92) 98 Room Air  


 


8/21/18 11:24 36.5 57 18 120/73 (89) 99 Room Air  


 


8/21/18 08:00     97 Room Air  


 


8/21/18 07:26 36.4 57 18 131/79 (96) 97 Room Air  


 


8/21/18 03:56 36.7 65 18 145/82 (103) 95 Room Air  


 


8/21/18 00:00      Room Air  


 


8/20/18 23:01 36.9 65 16 121/67 (85) 98 Room Air  


 


8/20/18 20:00      Room Air  











Physical Exam


General Appearance:  no apparent distress


ENT:  pharynx normal


Neck:  no JVD


Respiratory/Chest:  lungs clear, no respiratory distress, no accessory muscle 

use


Cardiovascular:  regular rate, rhythm, no gallop, no murmur


Abdomen:  normal bowel sounds, soft, no organomegaly, + tenderness (near 

junction of stomach and esophagus )


Extremities:  no pedal edema


Neurologic/Psychiatric:  alert, oriented x 3


Skin:  no rash





Laboratory Results





Last 24 Hours








Test


  8/20/18


20:11 8/20/18


23:17 8/21/18


05:15 8/21/18


12:37


 


Activated Partial


Thromboplast Time 54.6 SECONDS 


  


  42.3 SECONDS 


  49.4 SECONDS 


 


 


Partial Thromboplastin Ratio 2.1   1.6  1.9 


 


Troponin I  0.390 ng/ml  0.246 ng/ml  0.147 ng/ml 


 


White Blood Count   8.99 K/uL  


 


Red Blood Count   3.95 M/uL  


 


Hemoglobin   11.6 g/dL  


 


Hematocrit   34.4 %  


 


Mean Corpuscular Volume   87.1 fL  


 


Mean Corpuscular Hemoglobin   29.4 pg  


 


Mean Corpuscular Hemoglobin


Concent 


  


  33.7 g/dl 


  


 


 


Platelet Count   263 K/uL  


 


Mean Platelet Volume   9.9 fL  


 


Neutrophils (%) (Auto)   63.8 %  


 


Lymphocytes (%) (Auto)   27.5 %  


 


Monocytes (%) (Auto)   7.9 %  


 


Eosinophils (%) (Auto)   0.3 %  


 


Basophils (%) (Auto)   0.3 %  


 


Neutrophils # (Auto)   5.73 K/uL  


 


Lymphocytes # (Auto)   2.47 K/uL  


 


Monocytes # (Auto)   0.71 K/uL  


 


Eosinophils # (Auto)   0.03 K/uL  


 


Basophils # (Auto)   0.03 K/uL  


 


RDW Standard Deviation   43.6 fL  


 


RDW Coefficient of Variation   13.6 %  


 


Immature Granulocyte % (Auto)   0.2 %  


 


Immature Granulocyte # (Auto)   0.02 K/uL  


 


Sodium Level   142 mmol/L  


 


Potassium Level   3.7 mmol/L  


 


Chloride Level   112 mmol/L  


 


Carbon Dioxide Level   24 mmol/L  


 


Anion Gap   6.0 mmol/L  


 


Blood Urea Nitrogen   8 mg/dl  


 


Creatinine   0.56 mg/dl  


 


Est Creatinine Clear Calc


Drug Dose 


  


  96.8 ml/min 


  


 


 


Estimated GFR (


American) 


  


  118.3 


  


 


 


Estimated GFR (Non-


American 


  


  102.1 


  


 


 


BUN/Creatinine Ratio   14.7  


 


Random Glucose   94 mg/dl  


 


Calcium Level   7.7 mg/dl  


 


Magnesium Level   2.1 mg/dl  


 


Lipase   174 U/L  


 


Test


  8/21/18


17:11 


  


  


 


 


Bedside Glucose 83 mg/dl    











Assessment and Plan


56yo female -





1.  acute pancreatitis - clinically resolving/biochemically resolved.  Etiology?


No obvious gallstones on present or past imaging. 


Denies etoh. 


Calcium normal. 


Triglycerides normal.  





In light of recurrent pancreatitis will check MRCP -- r/o pancreatic divisum, r/

o CBD abnormalities, etc. 





Cont IVF. 


Start clears. 





2.  positive troponin -- ?ACS vs myocardial demand ischemia - suspect latter.  


Records reviewed -- [facility] - minimal luminal 

irregularities of all major vessels.  


Stop heparin. 


She should be on risk factor reduction meds (statin, asa, etc).  


ECHO from yesterday w/o wall motion abnormalities.  


Cont to follow. 





3.  hypokalemia, hypomagnesemia - resolved.  





4.  DVT proph - add lovenox 40mg daily in am. 





5.  abnormal distal esophagus on CTA chest, GERD symptoms, discomfort - PPI 

twice daily.  Needs endoscopic evaluation after d/c by GI. 


Add carafate QID.





6.  FEN - cont current IVF, BMP am, clears. 





7.  HTN - now controlled; monitor.  Severe elevation at presentation was likely 

due to pain. 





home tomorrow?


Continued [facility] stay due to:  inadequate po fluid intake, multiple IV 

medications needed


Discharge planning:  home

## 2018-08-21 NOTE — DIAGNOSTIC IMAGING REPORT
MRCP



CLINICAL HISTORY: Pancreatitis.



COMPARISON STUDY: Abdominal CT dated 8/20/2018. Abdominal ultrasound dated

3/1/2018.



TECHNIQUE: Abdominal MRCP is performed utilizing various T2 sequences in the

axial and coronal planes. IV contrast was not administered for this examination.

3-D reformats are created and assessed. The examination is modestly degraded by

motion artifact.



FINDINGS:



The gallbladder is normal in appearance. No gallstones are identified. There is

no intra or extrahepatic biliary ductal dilatation. The common bile duct

measures up to 4 mm. There is no evidence of choledocholithiasis. The pancreatic

duct is normal in caliber.



The liver, spleen, adrenal glands, kidneys, and pancreas are normal as

visualized. No peripancreatic stranding or fluid is identified. There is no

upper abdominal ascites. No bowel obstruction is identified. No upper abdominal

lymphadenopathy is seen. There is no pleural effusion. The bony structures are

normal as imaged.



IMPRESSION: Normal MRCP.







Dictated:  8/21/2018 8:39 PM

Transcribed:  8/21/2018 9:17 PM

Anisa







Electronically signed by:  Arsen Aguilar M.D.

8/21/2018 9:22 PM



Dictated Date/Time:  8/21/2018 8:39 PM

## 2018-08-22 VITALS
SYSTOLIC BLOOD PRESSURE: 182 MMHG | TEMPERATURE: 98.96 F | DIASTOLIC BLOOD PRESSURE: 94 MMHG | OXYGEN SATURATION: 100 % | HEART RATE: 57 BPM

## 2018-08-22 VITALS
DIASTOLIC BLOOD PRESSURE: 94 MMHG | TEMPERATURE: 98.96 F | SYSTOLIC BLOOD PRESSURE: 182 MMHG | OXYGEN SATURATION: 100 % | HEART RATE: 57 BPM

## 2018-08-22 VITALS
HEART RATE: 67 BPM | OXYGEN SATURATION: 95 % | TEMPERATURE: 98.24 F | SYSTOLIC BLOOD PRESSURE: 177 MMHG | DIASTOLIC BLOOD PRESSURE: 97 MMHG

## 2018-08-22 VITALS
SYSTOLIC BLOOD PRESSURE: 161 MMHG | TEMPERATURE: 98.24 F | HEART RATE: 57 BPM | OXYGEN SATURATION: 96 % | DIASTOLIC BLOOD PRESSURE: 93 MMHG

## 2018-08-22 VITALS — OXYGEN SATURATION: 96 %

## 2018-08-22 VITALS
SYSTOLIC BLOOD PRESSURE: 164 MMHG | DIASTOLIC BLOOD PRESSURE: 80 MMHG | OXYGEN SATURATION: 91 % | TEMPERATURE: 98.96 F | HEART RATE: 62 BPM

## 2018-08-22 VITALS — SYSTOLIC BLOOD PRESSURE: 161 MMHG | DIASTOLIC BLOOD PRESSURE: 81 MMHG | TEMPERATURE: 98.06 F | HEART RATE: 62 BPM

## 2018-08-22 LAB
BASOPHILS # BLD: 0.02 K/UL (ref 0–0.2)
BASOPHILS NFR BLD: 0.3 %
BUN SERPL-MCNC: 5 MG/DL (ref 7–18)
CALCIUM SERPL-MCNC: 8.6 MG/DL (ref 8.5–10.1)
CO2 SERPL-SCNC: 25 MMOL/L (ref 21–32)
CREAT SERPL-MCNC: 0.56 MG/DL (ref 0.6–1.2)
EOS ABS #: 0.06 K/UL (ref 0–0.5)
EOSINOPHIL NFR BLD AUTO: 283 K/UL (ref 130–400)
GLUCOSE SERPL-MCNC: 84 MG/DL (ref 70–99)
HCT VFR BLD CALC: 36.7 % (ref 37–47)
HGB BLD-MCNC: 12.4 G/DL (ref 12–16)
IG#: 0.02 K/UL (ref 0–0.02)
IMM GRANULOCYTES NFR BLD AUTO: 34.7 %
LYMPHOCYTES # BLD: 2.7 K/UL (ref 1.2–3.4)
MCH RBC QN AUTO: 29.4 PG (ref 25–34)
MCHC RBC AUTO-ENTMCNC: 33.8 G/DL (ref 32–36)
MCV RBC AUTO: 87 FL (ref 80–100)
MONO ABS #: 0.62 K/UL (ref 0.11–0.59)
MONOCYTES NFR BLD: 8 %
NEUT ABS #: 4.37 K/UL (ref 1.4–6.5)
NEUTROPHILS # BLD AUTO: 0.8 %
NEUTROPHILS NFR BLD AUTO: 55.9 %
PMV BLD AUTO: 10.1 FL (ref 7.4–10.4)
POTASSIUM SERPL-SCNC: 3.7 MMOL/L (ref 3.5–5.1)
RED CELL DISTRIBUTION WIDTH CV: 13.5 % (ref 11.5–14.5)
RED CELL DISTRIBUTION WIDTH SD: 43.2 FL (ref 36.4–46.3)
SODIUM SERPL-SCNC: 143 MMOL/L (ref 136–145)
WBC # BLD AUTO: 7.79 K/UL (ref 4.8–10.8)

## 2018-08-22 RX ADMIN — DICLOFENAC SODIUM SCH APPLN: 10 GEL TOPICAL at 16:25

## 2018-08-22 RX ADMIN — SUCRALFATE SCH GM: 1 SUSPENSION ORAL at 11:34

## 2018-08-22 RX ADMIN — SUCRALFATE SCH GM: 1 SUSPENSION ORAL at 06:02

## 2018-08-22 RX ADMIN — DICLOFENAC SODIUM SCH APPLN: 10 GEL TOPICAL at 16:23

## 2018-08-22 RX ADMIN — PANTOPRAZOLE SODIUM SCH MLS/MIN: 40 INJECTION, POWDER, FOR SOLUTION INTRAVENOUS at 08:44

## 2018-08-22 RX ADMIN — Medication SCH MG: at 08:45

## 2018-08-22 RX ADMIN — SODIUM CHLORIDE AND POTASSIUM CHLORIDE SCH MLS/HR: 9; 1.49 INJECTION, SOLUTION INTRAVENOUS at 04:10

## 2018-08-22 RX ADMIN — SUCRALFATE SCH GM: 1 SUSPENSION ORAL at 16:22

## 2018-08-22 NOTE — CARDIOLOGY FOLLOW-UP
Subjective


Date of Service:


Aug 22, 2018.


Pt evaluation today including:  conversation w/ patient, physical exam, lab 

review, review of studies, review of inpatient medication list


History of Present Illness


This is a 57-year-old woman who has a history of known nonocclusive coronary 

artery disease followed by Dr. Coyle in [place].  She had catheterization 

in 2014.  She has refused statin therapy.  She also has a history of 

hypertension and has refused blood pressure medication.





I was not able to interview her adequately in the emergency room as she just 

received Ativan, however I did spend a lot of time with her yesterday afternoon 

discussing her situation.  She had by then had resolution of her discomfort and 

was not having chest or abdominal discomfort.





Social History


Smoking Status:  Never Smoker


History of Alcohol Use:  Yes





Review of Systems


Respiratory:  No cough, No wheezing, No shortness of breath, No dyspnea on 

exertion


Cardiac:  No chest pain, No orthopnea, No PND, No edema, No palpitations


Probably not reliable due to just receiving Ativan and lack of concentration





Medications


No cardiovascular





Objective





Vital Signs Past 12 Hours








  Date Time  Temp Pulse Resp B/P (MAP) Pulse Ox O2 Delivery O2 Flow Rate FiO2


 


8/22/18 07:28 36.8 57 18 161/93 (115) 96 Room Air  


 


8/22/18 03:08 36.7 62 20 161/81 (107)  Room Air  


 


8/22/18 00:00      Room Air  








Last Recorded Weight-Kilograms:  68.000


Physical Exam


Constitutional:  


   Level of Distress:  NAD


Lungs:  


   Respiratory effort:  no dyspnea, good air movement


   Auscultation:  breath sounds normal, no wheezing


Cardiovascular:  


   Heart Auscultation:  RRR, no murmurs, no rubs, no gallops


Peripheral Pulses:  


   Bruits:  none appreciated


Extremities:  no edema





Data


Laboratory Results:





Last 24 Hours








Test


  8/21/18


12:37 8/21/18


17:11 8/22/18


06:07


 


Activated Partial


Thromboplast Time 49.4 SECONDS 


  


  


 


 


Partial Thromboplastin Ratio 1.9   


 


Troponin I 0.147 ng/ml   


 


Bedside Glucose  83 mg/dl  


 


White Blood Count   7.79 K/uL 


 


Red Blood Count   4.22 M/uL 


 


Hemoglobin   12.4 g/dL 


 


Hematocrit   36.7 % 


 


Mean Corpuscular Volume   87.0 fL 


 


Mean Corpuscular Hemoglobin   29.4 pg 


 


Mean Corpuscular Hemoglobin


Concent 


  


  33.8 g/dl 


 


 


Platelet Count   283 K/uL 


 


Mean Platelet Volume   10.1 fL 


 


Neutrophils (%) (Auto)   55.9 % 


 


Lymphocytes (%) (Auto)   34.7 % 


 


Monocytes (%) (Auto)   8.0 % 


 


Eosinophils (%) (Auto)   0.8 % 


 


Basophils (%) (Auto)   0.3 % 


 


Neutrophils # (Auto)   4.37 K/uL 


 


Lymphocytes # (Auto)   2.70 K/uL 


 


Monocytes # (Auto)   0.62 K/uL 


 


Eosinophils # (Auto)   0.06 K/uL 


 


Basophils # (Auto)   0.02 K/uL 


 


RDW Standard Deviation   43.2 fL 


 


RDW Coefficient of Variation   13.5 % 


 


Immature Granulocyte % (Auto)   0.3 % 


 


Immature Granulocyte # (Auto)   0.02 K/uL 


 


Sodium Level   143 mmol/L 


 


Potassium Level   3.7 mmol/L 


 


Chloride Level   109 mmol/L 


 


Carbon Dioxide Level   25 mmol/L 


 


Anion Gap   8.0 mmol/L 


 


Blood Urea Nitrogen   5 mg/dl 


 


Creatinine   0.56 mg/dl 


 


Est Creatinine Clear Calc


Drug Dose 


  


  97.8 ml/min 


 


 


Estimated GFR (


American) 


  


  118.3 


 


 


Estimated GFR (Non-


American 


  


  102.1 


 


 


BUN/Creatinine Ratio   8.5 


 


Random Glucose   84 mg/dl 


 


Calcium Level   8.6 mg/dl 








Imaging: Echocardiogram shows normal left ventricular function without regional 

wall motion abnormalities





EKG: Sinus rhythm, no acute changes





Telemetry reviewed: Sinus rhythm, no significant arrhythmia





Assessment and Plan


1.  Elevated troponin: She had significant troponin elevation, I suspect this 

was demand ischemia, she has known underlying coronary artery disease based on 

catheterization 4 years ago and has refused statin therapy as well as treatment 

for her hypertension.  I suspect she has had progression but cannot prove it 

without further testing and do not think we should do that.  I do not believe 

she is symptomatic coronary artery disease.  She does however need risk factor 

modification, she needs statin therapy and blood pressure medications.  She 

quotes various physicians as telling her that she probably does not have much 

risk and that her arteries probably are not bad, obviously that is probably not 

true and without statin therapy and blood pressure treatment she will almost 

certainly develop clinical coronary disease at some point in the future, the 

time course is impossible to predict.  Currently she is refusing, she can 

discuss this with her cardiologist in [place].  It is a long-term management 

issue not in acute one.





2.  Abdominal discomfort: Based on her elevated lipase and elevated white count 

I suspect this was acute pancreatitis.  I doubt it is referred pain from a 

cardiac etiology.





3.  Severe hypertension: This is likely stress related in part although she has 

a history of very high blood pressure and has refused treatment.  Her blood 

pressure has remained elevated.  I would recommend treating this with beta-

blockade at a minimum, but she refuses.





I will sign off, I will be happy to see her if she develops any further cardiac 

issues.  Thank you for allowing me to participate in her care.

## 2018-08-22 NOTE — DISCHARGE INSTRUCTIONS
Discharge Instructions


Date of Service


Aug 22, 2018.





Admission


Reason for Admission:  Acute Pancreatitis





Discharge


Discharge Diagnosis / Problem:  Acute Pancreatitis - improved/resolved





Discharge Goals


Goal(s):  Learn about illness, Diagnostic testing, Therapeutic intervention





Activity Recommendations


Activity Limitations:  resume your previous activity





.





Instructions / Follow-Up


Instructions / Follow-Up





1.  acute pancreatitis - this has improved nicely during your stay.  You were 

seen by the [facility] GI team who is recommending an outpatient "endoscopic 

ultrasound."  This will be arranged in the next week or so.  They are also 

recommending some additional blood work that will be done as an outpatient.  At 

this time the exact cause of the pancreatitis is uncertain but hopefully the 

other tests planned will shed some light on this.





2.  diet - 


* for the next 24-36 hours please follow a "full liquids diet" -- this includes 

water, broth, juices, gatorade, caffeine free soda, jello, italian ice, low fat 

milk, low fat yogurt and ice cream, and cream-based soups. 


* after about 36 hours you can gradually add in low-fat solids.  Please avoid 

fast foods, fried foods, spicy foods, high-fat foods.  Follow a low-fat diet 

for about 1 week.  


* it is extremely important to avoid all forms of alcohol





3.  abnormal esophagus on CAT scan, suspected reflux disease - 


* take pantoprazole 40mg twice a day every day


* take carafate (sucralfate) 1gm before meals and at bedtime for 10 days


* both prescriptions sent to [facility] for you


* the outpatient endoscopic ultrasound will be able to look at the esophagus in 

more detail





4.  high blood pressure - I would strongly recommend that you start on 

amlodipine 5mg once a day for your high blood pressure. 





5.  history of mild coronary artery disease as seen on heart cath in 2014 in 

[place] - please follow-up with the cardiologist in [place] as scheduled.  


Of note - your echocardiogram heart ultrasound was normal.  





6.  Please talk to Dr. Patino about obtaining a referral to rheumatology to 

discuss your concerns about autoimmune disease and possible Lupus. 





7.  Follow-up appointments - see separate section.  





8.  Left arm bruising - again there was no blood clot or any problem with the 

veins on the ultrasound.  Please use a warm compress to the area several times 

a day for the next few days to help the swelling/bruising go away. 





9.  Return to [facility] if - 


* your abdominal pain returns


* you develop recurrent nausea or vomiting 


* you develop severe chest pains


* you are unable to eat/drink


* you develop fever over 100.5 degrees


* any other concerns





Current Hospital Diet


Patient's current hospital diet: Full Liquid Diet





Discharge Diet


Recommended Diet:  Full Liquid Diet (with advancement to low fat diet over the 

next 2 days)





Procedures


Procedures Performed:  


CAT scans of the chest, abdomen, and pelvis.  These showed an abnormal


esophagus ("food pipe").


MRI of the gall bladder and pancreas - normal, no signs of gallstones or


other abnormalities.


Doppler of the left arm - no blood clot. 


Thoracic spine x-ray - normal.





Pending Studies


Studies pending at discharge:  no





Laboratory Results





Lipid Panel








Test


  8/20/18


17:22 Range/Units


 


 


Triglycerides Level 91  0-150  mg/dl


 


Cholesterol Level 160  0-200  mg/dl


 


HDL Cholesterol 49   mg/dl


 


Cholesterol/HDL Ratio 3.3   


 


LDL Cholesterol, Calculated 93   mg/dl











Medical Emergencies








.


Who to Call and When:





Medical Emergencies:  If at any time you feel your situation is an emergency, 

please call 911 immediately.





.





Non-Emergent Contact


Non-Emergency issues call your:  Primary Care Provider


Call Non-Emergent contact if:  temperature is above 100.5, your pain is not 

controlled, your pain is worsening, your pain is unusual for you, your pain is 

concerning you, you have any medication questions





.


.








"Provider Documentation" section prepared by Merlin Traylor.








.

## 2018-08-22 NOTE — DIAGNOSTIC IMAGING REPORT
LEFT UPPER EXTREMITY VENOUS DOPPLER ULTRASOUND



CLINICAL HISTORY: left arm superficial thrombophlebitis?    



COMPARISON STUDY:  No previous studies for comparison.



FINDINGS: The left internal jugular, subclavian, axillary, cephalic, brachial,

basilic, radial and ulnar veins are patent. No superficial thrombus was

identified within the left upper extremity. Note was made of a prominent left

upper extremity node.



IMPRESSION:  No deep venous thrombus within the left upper extremity. 









Electronically signed by:  Capo Palacios M.D.

8/22/2018 3:27 PM



Dictated Date/Time:  8/22/2018 3:25 PM

## 2018-08-22 NOTE — GASTROINTESTINAL CONSULTATION
Gastrointestinal Consultation


Date of Consultation:  Aug 22, 2018


Attending Physician:  Dr. Traylor


Consulting Physician:  Kristan Avalos PA-C


Reason for Consultation:  Pancreatitis


History of Present Illness


Patient is a 57 year old female with a PMH of HTN, HLD, adrenal tumor, CAD, & 

recurrent idiopathic pancreatitis who presented to the hospital after 

developing a sudden onset of abdominal pain on Sunday 8/19. She reports that 

the pain progressively worsened and she developed nausea and vomiting. She 

decided that since she has had pancreatitis previously, she did not want to 

wait too long before presenting to the hospital, so she presented on 8/19. Her 

symptoms improved. Her Lipase and CT were normal, so she went home. Her 

symptoms returned so she returned to the ED on 8/20. She describes her pain as 

epigastric. She had a CTA with mild thickening of the distal esophagus. She had 

a lipase of 2023 on admission. It has since normalized. She reports her pain 

has improved as well. She is taking Carafate & Protonix 40 mg BID. She reports 

significant NSAID use recently. She had an EGD in 2016 for melena and abdominal 

pain. It was unremarkable with the exception of a hiatal hernia. She was to 

begin Protonix at that time, but did not follow through with that. She had a CT 

scan that indicated an unremarkable pancreas and a normal MRCP. Her troponins 

have been elevated since admission. 





She denies alcohol use. She denies cholesterol issues. She denies family 

history of pancreatic issues.





Past Medical/Surgical History


Medical Problems:


(1) Abdominal pain


Status: Acute  





(2) Diarrhea


Status: Acute  





(3) GI bleed


Status: Acute  





(4) Influenza


Status: Acute  





(5) Nausea


Status: Acute  





(6) Pancreatitis


Status: Acute  





(7) Vomiting


Status: Acute  





Past Medical History:


HTN, HLD, adrenal tumor, CAD


Past Surgical History:


Cardiac cath, colonoscopy, EGD





Family History





FH: arthritis


FH: stroke





Social History


Smoking Status:  Never Smoker


Drug Use:  marijuana (once a month, last was over a month ago)


Marital Status:  


Housing Status:  lives with significant other





Allergies


Coded Allergies:  


     Hydroquinone (Verified  Allergy, Severe, BLISTER, 8/20/18)


 AFFECTS HER AT "20%"


     Hydrocortisone (Verified  Allergy, Unknown, BLISTERS, 8/20/18)


     Latex1 -Allergic Contact Dermititis (Verified  Allergy, Unknown, BLISTERS, 

8/20/18)


     Neomycin (Verified  Allergy, Unknown, BLISTERS, 8/20/18)


Uncoded Allergies:  


     AMMONIUM THIOGLYCOLATE (Allergy, Severe, BLISTERS, 5/14/18)


 COMPONENT OF LATEX





Current Medications





Home Meds and Scripts








 Medications  Dose


 Route/Sig


 Max Daily Dose Days Date Category


 


 Vitamin D3


  (Cholecalciferol)


 1,000 Unit Tab  1 Tab


 PO DAILY


   90 8/19/18 Reported


 


 Aspirin 81


  (Aspirin) 81 Mg


 Tab  81 Mg


 PO DAILY


    2/28/18 Reported











Review of Systems


Constitutional:  No fever, No chills


Eyes:  No problem reported


Respiratory:  No cough, No shortness of breath


Cardiac:  No chest pain


Abdomen:  No pain, No nausea, No vomiting, No diarrhea, No GI bleeding


Musculoskeletal:  No joint pain


Neuro:  No problem reported


Heme:  No problem reported


Endo:  No problem reported


Skin:  No problem reported





Physical Exam











  Date Time  Temp Pulse Resp B/P (MAP) Pulse Ox O2 Delivery O2 Flow Rate FiO2


 


8/22/18 11:04 36.8 67 20 177/97 (123) 95 Room Air  


 


8/22/18 08:00     96 Room Air 2.0 


 


8/22/18 07:28 36.8 57 18 161/93 (115) 96 Room Air  


 


8/22/18 03:08 36.7 62 20 161/81 (107)  Room Air  


 


8/22/18 00:00      Room Air  


 


8/21/18 20:00      Room Air  


 


8/21/18 18:58 36.8 66 18 145/86 (105) 99 Room Air  


 


8/21/18 16:00      Room Air  


 


8/21/18 15:25 36.7 69 18 147/65 (92) 98 Room Air  








General Appearance:  WD/WN, no apparent distress


Eyes:  normal inspection, PERRL


ENT:  hearing grossly normal


Respiratory/Chest:  lungs clear, normal breath sounds


Cardiovascular:  regular rate, rhythm


Abdomen:  normal bowel sounds, non tender, soft


Extremities:  non-tender


Neurologic/Psych:  alert, oriented x 3


Skin:  normal color





Laboratory Results





Last 24 Hours








Test


  8/21/18


17:11 8/22/18


06:07


 


Bedside Glucose 83 mg/dl  


 


White Blood Count  7.79 K/uL 


 


Red Blood Count  4.22 M/uL 


 


Hemoglobin  12.4 g/dL 


 


Hematocrit  36.7 % 


 


Mean Corpuscular Volume  87.0 fL 


 


Mean Corpuscular Hemoglobin  29.4 pg 


 


Mean Corpuscular Hemoglobin


Concent 


  33.8 g/dl 


 


 


Platelet Count  283 K/uL 


 


Mean Platelet Volume  10.1 fL 


 


Neutrophils (%) (Auto)  55.9 % 


 


Lymphocytes (%) (Auto)  34.7 % 


 


Monocytes (%) (Auto)  8.0 % 


 


Eosinophils (%) (Auto)  0.8 % 


 


Basophils (%) (Auto)  0.3 % 


 


Neutrophils # (Auto)  4.37 K/uL 


 


Lymphocytes # (Auto)  2.70 K/uL 


 


Monocytes # (Auto)  0.62 K/uL 


 


Eosinophils # (Auto)  0.06 K/uL 


 


Basophils # (Auto)  0.02 K/uL 


 


RDW Standard Deviation  43.2 fL 


 


RDW Coefficient of Variation  13.5 % 


 


Immature Granulocyte % (Auto)  0.3 % 


 


Immature Granulocyte # (Auto)  0.02 K/uL 


 


Sodium Level  143 mmol/L 


 


Potassium Level  3.7 mmol/L 


 


Chloride Level  109 mmol/L 


 


Carbon Dioxide Level  25 mmol/L 


 


Anion Gap  8.0 mmol/L 


 


Blood Urea Nitrogen  5 mg/dl 


 


Creatinine  0.56 mg/dl 


 


Est Creatinine Clear Calc


Drug Dose 


  97.8 ml/min 


 


 


Estimated GFR (


American) 


  118.3 


 


 


Estimated GFR (Non-


American 


  102.1 


 


 


BUN/Creatinine Ratio  8.5 


 


Random Glucose  84 mg/dl 


 


Calcium Level  8.6 mg/dl 











Impression


Patient is a 58 yo female who was hospitalized with pancreatitis. Pain has 

improved. Her lipase has normalized and her imaging is unremarkable. She had 

CTA findings of distal esophageal thickening. She has had recurrent idiopathic 

pancreatitis in the past (at least 3 occasions).





Plan


1) Clear liquid diet. 


2) Protonix 40 mg BID. 


3) Carafate 1 gm four times daily before meals and at bedtime x 10 days. 


4) Outpatient IgG4 subclasses for autoimmune pancreatitis. Outpatient EGD/EUS 

to investigate both abnormal CT findings of the esophagus & recurrent 

pancreatitis. 


5) Supportive care per primary team. 





Agree with RAMONITA Baron as above


Abd:  Soft, NT, ND, +BS


Will need outpatient workup with EGD/EUS, which we do not perform, but will 

help to arrange.

## 2018-08-22 NOTE — DIAGNOSTIC IMAGING REPORT
THORACIC SPINE 3 VIEWS



CLINICAL HISTORY: Thoracic back pain.



FINDINGS: AP, lateral, and swimmer's views of the thoracic spine are correlated

with chest CT dated 8/20/2018. The skeletal structures are osteopenic. There is

no radiographic evidence of fracture or malalignment. Vertebral body height and

alignment are maintained throughout the thoracic spine. Small anterior

osteophytes are seen throughout. The transverse processes and pedicles are

grossly intact as seen on the frontal view. Mild multilevel degenerative disc

space narrowing is noted with associated endplate sclerosis. The visualized lung

parenchyma appears clear.



IMPRESSION: No acute bony abnormality is seen involving the thoracic spine.







Dictated:  8/22/2018 2:39 PM

Transcribed:  8/22/2018 2:43 PM

[email or web address]







Electronically signed by:  Arsen Aguilar M.D.

8/22/2018 3:13 PM



Dictated Date/Time:  8/22/2018 2:39 PM